# Patient Record
Sex: MALE | Race: WHITE | Employment: STUDENT | URBAN - METROPOLITAN AREA
[De-identification: names, ages, dates, MRNs, and addresses within clinical notes are randomized per-mention and may not be internally consistent; named-entity substitution may affect disease eponyms.]

---
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
## 2018-05-28 NOTE — LETTER
April 13, 2017    Patient: Molly Edwards   Date of Visit: 4/12/2017       To Whom It May Concern:    Matt Santos was seen and treated in our emergency department on 4/12/2017. He should not return to school until 4/14/17.     If you have any que
none

## 2021-07-05 ENCOUNTER — WALK IN (OUTPATIENT)
Dept: URGENT CARE | Age: 21
End: 2021-07-05

## 2021-07-05 VITALS
HEART RATE: 84 BPM | WEIGHT: 155 LBS | BODY MASS INDEX: 20.99 KG/M2 | HEIGHT: 72 IN | RESPIRATION RATE: 20 BRPM | TEMPERATURE: 99.3 F | DIASTOLIC BLOOD PRESSURE: 84 MMHG | SYSTOLIC BLOOD PRESSURE: 120 MMHG

## 2021-07-05 DIAGNOSIS — H72.91 ACUTE OTITIS MEDIA OF RIGHT EAR WITH PERFORATED TYMPANIC MEMBRANE: ICD-10-CM

## 2021-07-05 DIAGNOSIS — J30.9 ALLERGIC RHINITIS, UNSPECIFIED SEASONALITY, UNSPECIFIED TRIGGER: ICD-10-CM

## 2021-07-05 DIAGNOSIS — H66.91 ACUTE OTITIS MEDIA OF RIGHT EAR WITH PERFORATED TYMPANIC MEMBRANE: ICD-10-CM

## 2021-07-05 DIAGNOSIS — H60.333 ACUTE SWIMMER'S EAR OF BOTH SIDES: Primary | ICD-10-CM

## 2021-07-05 PROCEDURE — X0943 AMG SELF PAY VISIT: HCPCS | Performed by: NURSE PRACTITIONER

## 2021-07-05 RX ORDER — FLUTICASONE PROPIONATE 50 MCG
2 SPRAY, SUSPENSION (ML) NASAL DAILY
Qty: 16 G | Refills: 0 | Status: SHIPPED | OUTPATIENT
Start: 2021-07-05

## 2021-07-05 RX ORDER — CIPROFLOXACIN AND DEXAMETHASONE 3; 1 MG/ML; MG/ML
4 SUSPENSION/ DROPS AURICULAR (OTIC) 2 TIMES DAILY
Qty: 7.5 ML | Refills: 0 | Status: SHIPPED | OUTPATIENT
Start: 2021-07-05

## 2023-05-25 ENCOUNTER — OFFICE VISIT (OUTPATIENT)
Dept: FAMILY MEDICINE CLINIC | Facility: CLINIC | Age: 23
End: 2023-05-25
Payer: COMMERCIAL

## 2023-05-25 VITALS
OXYGEN SATURATION: 98 % | HEIGHT: 72 IN | BODY MASS INDEX: 23.84 KG/M2 | WEIGHT: 176 LBS | RESPIRATION RATE: 16 BRPM | TEMPERATURE: 98 F | HEART RATE: 78 BPM | SYSTOLIC BLOOD PRESSURE: 132 MMHG | DIASTOLIC BLOOD PRESSURE: 84 MMHG

## 2023-05-25 DIAGNOSIS — J02.9 SORE THROAT: ICD-10-CM

## 2023-05-25 DIAGNOSIS — H66.001 NON-RECURRENT ACUTE SUPPURATIVE OTITIS MEDIA OF RIGHT EAR WITHOUT SPONTANEOUS RUPTURE OF TYMPANIC MEMBRANE: ICD-10-CM

## 2023-05-25 DIAGNOSIS — J31.0 RHINOSINUSITIS: Primary | ICD-10-CM

## 2023-05-25 DIAGNOSIS — J32.9 RHINOSINUSITIS: Primary | ICD-10-CM

## 2023-05-25 LAB
CONTROL LINE PRESENT WITH A CLEAR BACKGROUND (YES/NO): YES YES/NO
KIT LOT #: NORMAL NUMERIC
STREP GRP A CUL-SCR: NEGATIVE

## 2023-05-25 PROCEDURE — 87880 STREP A ASSAY W/OPTIC: CPT

## 2023-05-25 PROCEDURE — 3075F SYST BP GE 130 - 139MM HG: CPT

## 2023-05-25 PROCEDURE — 99202 OFFICE O/P NEW SF 15 MIN: CPT

## 2023-05-25 PROCEDURE — 3079F DIAST BP 80-89 MM HG: CPT

## 2023-05-25 PROCEDURE — 3008F BODY MASS INDEX DOCD: CPT

## 2023-05-25 RX ORDER — AMLODIPINE BESYLATE 5 MG/1
1 TABLET ORAL DAILY
COMMUNITY
Start: 2022-04-17

## 2023-05-25 RX ORDER — AMOXICILLIN 875 MG/1
875 TABLET, COATED ORAL 2 TIMES DAILY
Qty: 20 TABLET | Refills: 0 | Status: SHIPPED | OUTPATIENT
Start: 2023-05-25 | End: 2023-06-04

## 2023-05-25 RX ORDER — METOPROLOL SUCCINATE 50 MG/1
1 TABLET, EXTENDED RELEASE ORAL DAILY
COMMUNITY
Start: 2022-03-22

## 2023-05-30 ENCOUNTER — OFFICE VISIT (OUTPATIENT)
Dept: FAMILY MEDICINE CLINIC | Facility: CLINIC | Age: 23
End: 2023-05-30
Payer: COMMERCIAL

## 2023-05-30 VITALS
HEIGHT: 72 IN | WEIGHT: 176 LBS | BODY MASS INDEX: 23.84 KG/M2 | SYSTOLIC BLOOD PRESSURE: 128 MMHG | RESPIRATION RATE: 16 BRPM | DIASTOLIC BLOOD PRESSURE: 78 MMHG | HEART RATE: 90 BPM | OXYGEN SATURATION: 98 % | TEMPERATURE: 99 F

## 2023-05-30 DIAGNOSIS — J06.9 URI WITH COUGH AND CONGESTION: Primary | ICD-10-CM

## 2023-05-30 PROBLEM — I10 HTN (HYPERTENSION): Status: ACTIVE | Noted: 2022-01-13

## 2023-05-30 PROBLEM — R07.9 CHEST PAIN: Status: ACTIVE | Noted: 2023-05-30

## 2023-05-30 PROCEDURE — 3078F DIAST BP <80 MM HG: CPT | Performed by: NURSE PRACTITIONER

## 2023-05-30 PROCEDURE — 3008F BODY MASS INDEX DOCD: CPT | Performed by: NURSE PRACTITIONER

## 2023-05-30 PROCEDURE — 99213 OFFICE O/P EST LOW 20 MIN: CPT | Performed by: NURSE PRACTITIONER

## 2023-05-30 PROCEDURE — 3074F SYST BP LT 130 MM HG: CPT | Performed by: NURSE PRACTITIONER

## 2023-05-30 RX ORDER — FLUTICASONE PROPIONATE 50 MCG
1 SPRAY, SUSPENSION (ML) NASAL DAILY
Qty: 18 ML | Refills: 0 | Status: SHIPPED | OUTPATIENT
Start: 2023-05-30

## 2023-05-31 NOTE — PATIENT INSTRUCTIONS
Finish Amoxicillin antibiotic as prescribed  Tylenol and Motrin as needed  START Flonase nasal spray, 1 spray in each nare, daily  Continue Allegra for pnd/throat irritation  Mucinex DM for cough/congestion - use OTC as directed  Push fluids, rest  Return to care for new/worsening symptoms lasting beyond 2 weeks

## 2023-06-16 ENCOUNTER — OFFICE VISIT (OUTPATIENT)
Dept: FAMILY MEDICINE CLINIC | Facility: CLINIC | Age: 23
End: 2023-06-16
Payer: COMMERCIAL

## 2023-06-16 VITALS
HEIGHT: 71 IN | SYSTOLIC BLOOD PRESSURE: 110 MMHG | BODY MASS INDEX: 24.5 KG/M2 | DIASTOLIC BLOOD PRESSURE: 68 MMHG | HEART RATE: 75 BPM | RESPIRATION RATE: 20 BRPM | WEIGHT: 175 LBS | TEMPERATURE: 98 F

## 2023-06-16 DIAGNOSIS — R12 HEARTBURN: ICD-10-CM

## 2023-06-16 DIAGNOSIS — I10 HYPERTENSION, UNSPECIFIED TYPE: Primary | ICD-10-CM

## 2023-06-16 DIAGNOSIS — R41.840 INATTENTION: ICD-10-CM

## 2023-06-16 PROCEDURE — 3074F SYST BP LT 130 MM HG: CPT | Performed by: STUDENT IN AN ORGANIZED HEALTH CARE EDUCATION/TRAINING PROGRAM

## 2023-06-16 PROCEDURE — 3078F DIAST BP <80 MM HG: CPT | Performed by: STUDENT IN AN ORGANIZED HEALTH CARE EDUCATION/TRAINING PROGRAM

## 2023-06-16 PROCEDURE — 99204 OFFICE O/P NEW MOD 45 MIN: CPT | Performed by: STUDENT IN AN ORGANIZED HEALTH CARE EDUCATION/TRAINING PROGRAM

## 2023-06-16 PROCEDURE — 3008F BODY MASS INDEX DOCD: CPT | Performed by: STUDENT IN AN ORGANIZED HEALTH CARE EDUCATION/TRAINING PROGRAM

## 2023-06-16 RX ORDER — LORATADINE 10 MG/1
10 TABLET ORAL DAILY
COMMUNITY

## 2023-06-16 RX ORDER — OMEPRAZOLE 40 MG/1
40 CAPSULE, DELAYED RELEASE ORAL DAILY
Qty: 90 CAPSULE | Refills: 0 | Status: SHIPPED | OUTPATIENT
Start: 2023-06-16

## 2023-06-30 ENCOUNTER — MED REC SCAN ONLY (OUTPATIENT)
Dept: FAMILY MEDICINE CLINIC | Facility: CLINIC | Age: 23
End: 2023-06-30

## 2023-07-10 ENCOUNTER — TELEPHONE (OUTPATIENT)
Dept: FAMILY MEDICINE CLINIC | Facility: CLINIC | Age: 23
End: 2023-07-10

## 2023-07-10 DIAGNOSIS — R41.840 INATTENTION: ICD-10-CM

## 2023-07-10 DIAGNOSIS — I10 HYPERTENSION, UNSPECIFIED TYPE: Primary | ICD-10-CM

## 2023-07-10 RX ORDER — AMLODIPINE BESYLATE 5 MG/1
5 TABLET ORAL DAILY
Qty: 30 TABLET | Refills: 1 | Status: SHIPPED | OUTPATIENT
Start: 2023-07-10

## 2023-07-10 NOTE — TELEPHONE ENCOUNTER
I called pt and notified him the referral was placed for Dr. Chapincito Jacobson. Contact information to pt.

## 2023-07-10 NOTE — TELEPHONE ENCOUNTER
Pt called and stated he need a new referral the doctor he was referred to, is not taking new patients. Pt also need a refill on medication.  Pt have 5 days left     amLODIPine 5 MG Oral Tab     [id number] - [place], [address], [phone], [phone]

## 2023-07-10 NOTE — TELEPHONE ENCOUNTER
Refill for Amlodipine 5 mg # 30 sent.  Pt needs to have a new referral as Dr. Felix Jung is not taking any new patients,

## 2023-09-18 DIAGNOSIS — R12 HEARTBURN: ICD-10-CM

## 2023-09-18 RX ORDER — OMEPRAZOLE 40 MG/1
40 CAPSULE, DELAYED RELEASE ORAL DAILY
Qty: 90 CAPSULE | Refills: 0 | Status: SHIPPED | OUTPATIENT
Start: 2023-09-18

## 2023-10-20 ENCOUNTER — TELEPHONE (OUTPATIENT)
Dept: FAMILY MEDICINE CLINIC | Facility: CLINIC | Age: 23
End: 2023-10-20

## 2023-10-20 RX ORDER — AMLODIPINE BESYLATE 5 MG/1
5 TABLET ORAL DAILY
Qty: 30 TABLET | Refills: 0 | Status: SHIPPED | OUTPATIENT
Start: 2023-10-20

## 2023-10-20 NOTE — TELEPHONE ENCOUNTER
LOV:06/16/23      NOV:11/02/23    Medication:      Disp Refills Start End    amLODIPine 5 MG Oral Tab 30 tablet 1 7/10/2023     Sig - Route:  Take 1 tablet (5 mg total) by mouth daily. - Oral    Sent to pharmacy as: amLODIPine Besylate 5 MG Oral Tablet (Norvas        LM for pt RX was sent

## 2023-10-20 NOTE — TELEPHONE ENCOUNTER
Pt would like refill of amLODIPine 5 MG Oral Tab sent to [facility]ar[facility] [address] [facility], [address] [facility] [address] [place] [address], [phone], [phone] [[id number]] he is out of medication and is going out of town today. Thank you.

## 2023-10-23 RX ORDER — AMLODIPINE BESYLATE 5 MG/1
5 TABLET ORAL DAILY
Qty: 30 TABLET | Refills: 0 | OUTPATIENT
Start: 2023-10-23

## 2023-10-31 ENCOUNTER — LAB ENCOUNTER (OUTPATIENT)
Dept: LAB | Age: 23
End: 2023-10-31
Attending: STUDENT IN AN ORGANIZED HEALTH CARE EDUCATION/TRAINING PROGRAM
Payer: COMMERCIAL

## 2023-10-31 DIAGNOSIS — I10 HYPERTENSION, UNSPECIFIED TYPE: ICD-10-CM

## 2023-10-31 LAB
ALBUMIN SERPL-MCNC: 4.5 G/DL (ref 3.4–5)
ALBUMIN/GLOB SERPL: 1.4 {RATIO} (ref 1–2)
ALP LIVER SERPL-CCNC: 93 U/L
ALT SERPL-CCNC: 132 U/L
ANION GAP SERPL CALC-SCNC: 10 MMOL/L (ref 0–18)
AST SERPL-CCNC: 41 U/L (ref 15–37)
BASOPHILS # BLD AUTO: 0.04 X10(3) UL (ref 0–0.2)
BASOPHILS NFR BLD AUTO: 0.7 %
BILIRUB SERPL-MCNC: 0.7 MG/DL (ref 0.1–2)
BUN BLD-MCNC: 13 MG/DL (ref 9–23)
CALCIUM BLD-MCNC: 9.3 MG/DL (ref 8.5–10.1)
CHLORIDE SERPL-SCNC: 104 MMOL/L (ref 98–112)
CHOLEST SERPL-MCNC: 193 MG/DL (ref ?–200)
CO2 SERPL-SCNC: 25 MMOL/L (ref 21–32)
CORTIS SERPL-MCNC: 17.6 UG/DL
CREAT BLD-MCNC: 1.16 MG/DL
CREAT UR-SCNC: 325 MG/DL
EGFRCR SERPLBLD CKD-EPI 2021: 90 ML/MIN/1.73M2 (ref 60–?)
EOSINOPHIL # BLD AUTO: 0.2 X10(3) UL (ref 0–0.7)
EOSINOPHIL NFR BLD AUTO: 3.7 %
ERYTHROCYTE [DISTWIDTH] IN BLOOD BY AUTOMATED COUNT: 12.6 %
FASTING PATIENT LIPID ANSWER: YES
FASTING STATUS PATIENT QL REPORTED: YES
GLOBULIN PLAS-MCNC: 3.2 G/DL (ref 2.8–4.4)
GLUCOSE BLD-MCNC: 95 MG/DL (ref 70–99)
HCT VFR BLD AUTO: 46.4 %
HDLC SERPL-MCNC: 45 MG/DL (ref 40–59)
HGB BLD-MCNC: 16 G/DL
IMM GRANULOCYTES # BLD AUTO: 0.01 X10(3) UL (ref 0–1)
IMM GRANULOCYTES NFR BLD: 0.2 %
LDLC SERPL CALC-MCNC: 118 MG/DL (ref ?–100)
LYMPHOCYTES # BLD AUTO: 1.97 X10(3) UL (ref 1–4)
LYMPHOCYTES NFR BLD AUTO: 36.1 %
MCH RBC QN AUTO: 30.2 PG (ref 26–34)
MCHC RBC AUTO-ENTMCNC: 34.5 G/DL (ref 31–37)
MCV RBC AUTO: 87.7 FL
MICROALBUMIN UR-MCNC: 2.05 MG/DL
MICROALBUMIN/CREAT 24H UR-RTO: 6.3 UG/MG (ref ?–30)
MONOCYTES # BLD AUTO: 0.55 X10(3) UL (ref 0.1–1)
MONOCYTES NFR BLD AUTO: 10.1 %
NEUTROPHILS # BLD AUTO: 2.69 X10 (3) UL (ref 1.5–7.7)
NEUTROPHILS # BLD AUTO: 2.69 X10(3) UL (ref 1.5–7.7)
NEUTROPHILS NFR BLD AUTO: 49.2 %
NONHDLC SERPL-MCNC: 148 MG/DL (ref ?–130)
OSMOLALITY SERPL CALC.SUM OF ELEC: 288 MOSM/KG (ref 275–295)
PLATELET # BLD AUTO: 223 10(3)UL (ref 150–450)
POTASSIUM SERPL-SCNC: 4 MMOL/L (ref 3.5–5.1)
PROT SERPL-MCNC: 7.7 G/DL (ref 6.4–8.2)
RBC # BLD AUTO: 5.29 X10(6)UL
SODIUM SERPL-SCNC: 139 MMOL/L (ref 136–145)
TRIGL SERPL-MCNC: 170 MG/DL (ref 30–149)
TSI SER-ACNC: 2.32 MIU/ML (ref 0.36–3.74)
VLDLC SERPL CALC-MCNC: 30 MG/DL (ref 0–30)
WBC # BLD AUTO: 5.5 X10(3) UL (ref 4–11)

## 2023-10-31 PROCEDURE — 82533 TOTAL CORTISOL: CPT

## 2023-10-31 PROCEDURE — 82043 UR ALBUMIN QUANTITATIVE: CPT

## 2023-10-31 PROCEDURE — 82024 ASSAY OF ACTH: CPT

## 2023-10-31 PROCEDURE — 84443 ASSAY THYROID STIM HORMONE: CPT

## 2023-10-31 PROCEDURE — 83835 ASSAY OF METANEPHRINES: CPT

## 2023-10-31 PROCEDURE — 82088 ASSAY OF ALDOSTERONE: CPT

## 2023-10-31 PROCEDURE — 80061 LIPID PANEL: CPT

## 2023-10-31 PROCEDURE — 84244 ASSAY OF RENIN: CPT

## 2023-10-31 PROCEDURE — 85025 COMPLETE CBC W/AUTO DIFF WBC: CPT

## 2023-10-31 PROCEDURE — 82570 ASSAY OF URINE CREATININE: CPT

## 2023-10-31 PROCEDURE — 80053 COMPREHEN METABOLIC PANEL: CPT

## 2023-11-01 LAB — ACTH: 42.4 PG/ML

## 2023-11-02 ENCOUNTER — OFFICE VISIT (OUTPATIENT)
Dept: FAMILY MEDICINE CLINIC | Facility: CLINIC | Age: 23
End: 2023-11-02
Payer: COMMERCIAL

## 2023-11-02 VITALS
SYSTOLIC BLOOD PRESSURE: 130 MMHG | RESPIRATION RATE: 16 BRPM | HEART RATE: 66 BPM | TEMPERATURE: 98 F | WEIGHT: 185 LBS | BODY MASS INDEX: 25.9 KG/M2 | HEIGHT: 71 IN | DIASTOLIC BLOOD PRESSURE: 74 MMHG

## 2023-11-02 DIAGNOSIS — Z00.00 WELLNESS EXAMINATION: Primary | ICD-10-CM

## 2023-11-02 DIAGNOSIS — H66.93 BILATERAL ACUTE OTITIS MEDIA: ICD-10-CM

## 2023-11-02 DIAGNOSIS — R12 HEARTBURN: ICD-10-CM

## 2023-11-02 DIAGNOSIS — F40.243 FEAR OF FLYING: ICD-10-CM

## 2023-11-02 DIAGNOSIS — I10 HYPERTENSION, UNSPECIFIED TYPE: ICD-10-CM

## 2023-11-02 DIAGNOSIS — R74.8 ELEVATED LIVER ENZYMES: ICD-10-CM

## 2023-11-02 PROCEDURE — 3008F BODY MASS INDEX DOCD: CPT | Performed by: STUDENT IN AN ORGANIZED HEALTH CARE EDUCATION/TRAINING PROGRAM

## 2023-11-02 PROCEDURE — 99395 PREV VISIT EST AGE 18-39: CPT | Performed by: STUDENT IN AN ORGANIZED HEALTH CARE EDUCATION/TRAINING PROGRAM

## 2023-11-02 PROCEDURE — 3078F DIAST BP <80 MM HG: CPT | Performed by: STUDENT IN AN ORGANIZED HEALTH CARE EDUCATION/TRAINING PROGRAM

## 2023-11-02 PROCEDURE — 99214 OFFICE O/P EST MOD 30 MIN: CPT | Performed by: STUDENT IN AN ORGANIZED HEALTH CARE EDUCATION/TRAINING PROGRAM

## 2023-11-02 PROCEDURE — 3075F SYST BP GE 130 - 139MM HG: CPT | Performed by: STUDENT IN AN ORGANIZED HEALTH CARE EDUCATION/TRAINING PROGRAM

## 2023-11-02 RX ORDER — AMOXICILLIN AND CLAVULANATE POTASSIUM 875; 125 MG/1; MG/1
1 TABLET, FILM COATED ORAL 2 TIMES DAILY
Qty: 20 TABLET | Refills: 0 | Status: SHIPPED | OUTPATIENT
Start: 2023-11-02 | End: 2023-11-12

## 2023-11-02 RX ORDER — ALPRAZOLAM 0.5 MG/1
TABLET ORAL
COMMUNITY
Start: 2022-11-22 | End: 2023-11-02

## 2023-11-02 RX ORDER — ALPRAZOLAM 0.5 MG/1
0.5 TABLET ORAL DAILY PRN
Qty: 30 TABLET | Refills: 0 | Status: SHIPPED | OUTPATIENT
Start: 2023-11-02

## 2023-11-02 RX ORDER — METOPROLOL SUCCINATE 50 MG/1
50 TABLET, EXTENDED RELEASE ORAL DAILY
Qty: 90 TABLET | Refills: 1 | Status: SHIPPED | OUTPATIENT
Start: 2023-11-02

## 2023-11-02 RX ORDER — AMLODIPINE BESYLATE 5 MG/1
5 TABLET ORAL DAILY
Qty: 90 TABLET | Refills: 1 | Status: SHIPPED | OUTPATIENT
Start: 2023-11-02

## 2023-11-07 LAB
ALDOST/RENIN RATIO: 13.9
ALDOSTERONE: 20.4 NG/DL
RENIN ACTIVITY: 1.47 NG/ML/HR

## 2023-11-10 LAB
METANEPHRINE: 54 PG/ML
NORMETANEPHRINE: 89.8 PG/ML

## 2023-11-24 ENCOUNTER — OFFICE VISIT (OUTPATIENT)
Dept: FAMILY MEDICINE CLINIC | Facility: CLINIC | Age: 23
End: 2023-11-24
Payer: COMMERCIAL

## 2023-11-24 VITALS
RESPIRATION RATE: 18 BRPM | OXYGEN SATURATION: 99 % | WEIGHT: 182 LBS | BODY MASS INDEX: 25.48 KG/M2 | HEART RATE: 65 BPM | SYSTOLIC BLOOD PRESSURE: 130 MMHG | TEMPERATURE: 98 F | HEIGHT: 71 IN | DIASTOLIC BLOOD PRESSURE: 90 MMHG

## 2023-11-24 DIAGNOSIS — H69.91 DYSFUNCTION OF RIGHT EUSTACHIAN TUBE: Primary | ICD-10-CM

## 2023-11-24 DIAGNOSIS — J02.9 SORE THROAT: ICD-10-CM

## 2023-11-24 LAB
CONTROL LINE PRESENT WITH A CLEAR BACKGROUND (YES/NO): YES YES/NO
KIT LOT #: NORMAL NUMERIC

## 2023-11-24 PROCEDURE — 87880 STREP A ASSAY W/OPTIC: CPT | Performed by: NURSE PRACTITIONER

## 2023-11-24 PROCEDURE — 3075F SYST BP GE 130 - 139MM HG: CPT | Performed by: NURSE PRACTITIONER

## 2023-11-24 PROCEDURE — 3080F DIAST BP >= 90 MM HG: CPT | Performed by: NURSE PRACTITIONER

## 2023-11-24 PROCEDURE — 3008F BODY MASS INDEX DOCD: CPT | Performed by: NURSE PRACTITIONER

## 2023-11-24 PROCEDURE — 99213 OFFICE O/P EST LOW 20 MIN: CPT | Performed by: NURSE PRACTITIONER

## 2023-11-24 RX ORDER — LEVOCETIRIZINE DIHYDROCHLORIDE 5 MG/1
5 TABLET, FILM COATED ORAL EVERY EVENING
Qty: 30 TABLET | Refills: 0 | Status: SHIPPED | OUTPATIENT
Start: 2023-11-24

## 2023-11-24 RX ORDER — FLUTICASONE PROPIONATE 50 MCG
2 SPRAY, SUSPENSION (ML) NASAL DAILY
Qty: 1 EACH | Refills: 0 | Status: SHIPPED | OUTPATIENT
Start: 2023-11-24

## 2024-01-08 ENCOUNTER — OFFICE VISIT (OUTPATIENT)
Dept: FAMILY MEDICINE CLINIC | Facility: CLINIC | Age: 24
End: 2024-01-08
Payer: COMMERCIAL

## 2024-01-08 VITALS
SYSTOLIC BLOOD PRESSURE: 124 MMHG | BODY MASS INDEX: 25.62 KG/M2 | HEIGHT: 71 IN | OXYGEN SATURATION: 97 % | HEART RATE: 77 BPM | TEMPERATURE: 98 F | RESPIRATION RATE: 16 BRPM | DIASTOLIC BLOOD PRESSURE: 76 MMHG | WEIGHT: 183 LBS

## 2024-01-08 DIAGNOSIS — J06.9 VIRAL UPPER RESPIRATORY TRACT INFECTION: Primary | ICD-10-CM

## 2024-01-08 LAB
OPERATOR ID: NORMAL
RAPID SARS-COV-2 BY PCR: NOT DETECTED

## 2024-01-08 NOTE — PATIENT INSTRUCTIONS
Your COVID testing was negative in the office.    Use OTC meds for comfort as needed--  Ibuprofen/Tylenol for fever/pain  Use Benadryl at bedtime to reduce drainage and promote rest.  Zyrtec/Claritin/Allegra in the AM to reduce nasal drainage without sedation.   Use saline nasal sprays to reduce congestion and thin secretions.   Use Delsym for cough.   Consider applying francisca's vapo-rub or eucayptus oil to chest and feet at bedtime to reduce chest and nasal congestion.   Warm tea with honey, cough lozenges, vaporizers/steam etc.    If no better in 2-3 days, follow-up with your PCP for further evaluation.

## 2024-01-08 NOTE — PROGRESS NOTES
CHIEF COMPLAINT:     Chief Complaint   Patient presents with    Cough     All last week wasn't feeling that good, last night congestion, right ear pain, diarrhea with frequency, wheezing cough, fatigue            HPI:   Jesús Troy is a 23 year old male presents to clinic with complaints of cough, congestion, right ear pressure, diarrhea, fatigue. Fever to 99.  Reports + chills, + fever, no headache, no upset stomach, right ear pain, no rash, + diarrhea, no loss of smell/taste.    COVID exposure: none known  Sick contacts: none  COVID testing: none    Current Outpatient Medications   Medication Sig Dispense Refill    fluticasone propionate 50 MCG/ACT Nasal Suspension 2 sprays by Each Nare route daily. 1 each 0    levocetirizine 5 MG Oral Tab Take 1 tablet (5 mg total) by mouth every evening. 30 tablet 0    metoprolol succinate ER 50 MG Oral Tablet 24 Hr Take 1 tablet (50 mg total) by mouth daily. 90 tablet 1    amLODIPine 5 MG Oral Tab Take 1 tablet (5 mg total) by mouth daily. 90 tablet 1    ALPRAZolam 0.5 MG Oral Tab Take 1 tablet (0.5 mg total) by mouth daily as needed for Anxiety (flight). (Patient not taking: Reported on 1/8/2024) 30 tablet 0      Past Medical History:   Diagnosis Date    HTN (hypertension) 1/13/2022      Social History:  Social History     Socioeconomic History    Marital status: Single   Tobacco Use    Smoking status: Never    Smokeless tobacco: Never   Substance and Sexual Activity    Alcohol use: Yes     Comment: couple drinks on the weekends    Drug use: Yes     Types: Cannabis   Other Topics Concern    Caffeine Concern No    Exercise Yes     Comment: cardio, weight training        REVIEW OF SYSTEMS:   GENERAL HEALTH: feels well otherwise, decreased appetite  SKIN: denies any unusual skin lesions or rashes  HEENT: See HPI  RESPIRATORY: denies shortness of breath or wheezing  CARDIOVASCULAR: denies chest pain or palpitations   GI: denies vomiting or diarrhea  NEURO: denies dizziness  or lightheadedness    EXAM:   /76   Pulse 77   Temp 98.2 °F (36.8 °C) (Oral)   Resp 16   Ht 5' 11\" (1.803 m)   Wt 183 lb (83 kg)   SpO2 97%   BMI 25.52 kg/m²   GENERAL: well developed, well nourished, in no apparent distress  SKIN: no rashes,no suspicious lesions  HEAD: atraumatic, normocephalic  EYES: conjunctiva clear  EARS: TM's clear, non-injected, no bulging, slight b/l retraction, no  fluid bilaterally   NOSE: nostrils patent, clear nasal mucus, nasal mucosa red and swollen  THROAT: oral mucosa pink, moist. Posterior pharynx mildly erythematous. No exudates. Tonsils 2/4.  Uvula is midline.  Breath is not malodorous.  No trismus, hoarseness, muffled voice, or stridor.    NECK: supple  LUNGS: clear to auscultation bilaterally. Breathing is non labored.  CARDIO: RRR without murmur  EXTREMITIES: no cyanosis, clubbing or edema  LYMPH: no anterior cervical lymphadenopathy, no submandibular lymphadenopathy.  No  posterior cervical or occipital lymphadenopathy.    Recent Results (from the past 24 hour(s))   COVID-19 LAB TEST NON-CDC    Collection Time: 01/08/24  3:09 PM    Specimen: Nares   Result Value Ref Range    Rapid SARS-CoV-2 by PCR Not Detected Not Detected    POCT Lot Number [id number]     POCT Expiration Date 10/6/25     POCT Procedure Control Control Valid Control Valid     ,467            ASSESSMENT AND PLAN:     Encounter Diagnosis   Name Primary?    Viral upper respiratory tract infection Yes       Orders Placed This Encounter   Procedures    COVID-19 LAB TEST NON-CDC       Meds & Refills for this Visit:  Requested Prescriptions      No prescriptions requested or ordered in this encounter       Imaging & Consults:  None     Testing as above.  Comfort measures explained.   Reviewed quarantine guidelines.    Follow up with PCP in 3-5 days if not improving, condition worsens, or fever greater than or equal to 100.4 persists for 72 hours.    Verbalized understanding.    Patient  Instructions   Your COVID testing was negative in the office.    Use OTC meds for comfort as needed--  Ibuprofen/Tylenol for fever/pain  Use Benadryl at bedtime to reduce drainage and promote rest.  Zyrtec/Claritin/Allegra in the AM to reduce nasal drainage without sedation.   Use saline nasal sprays to reduce congestion and thin secretions.   Use Delsym for cough.   Consider applying francisca's vapo-rub or eucayptus oil to chest and feet at bedtime to reduce chest and nasal congestion.   Warm tea with honey, cough lozenges, vaporizers/steam etc.    If no better in 2-3 days, follow-up with your PCP for further evaluation.

## 2024-03-07 ENCOUNTER — OFFICE VISIT (OUTPATIENT)
Dept: FAMILY MEDICINE CLINIC | Facility: CLINIC | Age: 24
End: 2024-03-07
Payer: COMMERCIAL

## 2024-03-07 VITALS
TEMPERATURE: 99 F | RESPIRATION RATE: 16 BRPM | DIASTOLIC BLOOD PRESSURE: 84 MMHG | HEART RATE: 85 BPM | BODY MASS INDEX: 25.76 KG/M2 | HEIGHT: 71 IN | SYSTOLIC BLOOD PRESSURE: 128 MMHG | WEIGHT: 184 LBS | OXYGEN SATURATION: 99 %

## 2024-03-07 DIAGNOSIS — R05.8 POST-VIRAL COUGH SYNDROME: Primary | ICD-10-CM

## 2024-03-07 PROCEDURE — 99213 OFFICE O/P EST LOW 20 MIN: CPT | Performed by: NURSE PRACTITIONER

## 2024-03-07 RX ORDER — BENZONATATE 200 MG/1
200 CAPSULE ORAL 3 TIMES DAILY PRN
Qty: 30 CAPSULE | Refills: 0 | Status: SHIPPED | OUTPATIENT
Start: 2024-03-07 | End: 2024-03-17

## 2024-03-08 NOTE — PROGRESS NOTES
CHIEF COMPLAINT:     Chief Complaint   Patient presents with    Cough     Was sick for a week in [place], feels better but for the cough, x 6 weeks        HPI:   Jesús Troy is a 23 year old male who presents for cough for over 1 month. Reports cough is dry, persistent. Denies sob, wheezing, fever, chills, headache.  Symptoms have been same since onset.  Treating symptoms with nothing.      Current Outpatient Medications   Medication Sig Dispense Refill    benzonatate 200 MG Oral Cap Take 1 capsule (200 mg total) by mouth 3 (three) times daily as needed for cough. 30 capsule 0    levocetirizine 5 MG Oral Tab Take 1 tablet (5 mg total) by mouth every evening. 30 tablet 0    metoprolol succinate ER 50 MG Oral Tablet 24 Hr Take 1 tablet (50 mg total) by mouth daily. 90 tablet 1    amLODIPine 5 MG Oral Tab Take 1 tablet (5 mg total) by mouth daily. 90 tablet 1    fluticasone propionate 50 MCG/ACT Nasal Suspension 2 sprays by Each Nare route daily. (Patient not taking: Reported on 3/7/2024) 1 each 0    ALPRAZolam 0.5 MG Oral Tab Take 1 tablet (0.5 mg total) by mouth daily as needed for Anxiety (flight). (Patient not taking: Reported on 1/8/2024) 30 tablet 0      Past Medical History:   Diagnosis Date    HTN (hypertension) 1/13/2022      Past Surgical History:   Procedure Laterality Date    INJECTION, ANESTHETIC/STEROID, TRANSFORAMINAL EPIDURAL; LUMBAR/SACRAL, ADD'L LEVEL Left 10/27/2015    Procedure: LUMBAR / TRANSFORAMINAL EPIDURAL STEROID INJECTION;  Surgeon: Severino Ruvalcaba MD;  Location: [facility]    INJECTION, ANESTHETIC/STEROID, TRANSFORAMINAL EPIDURAL; LUMBAR/SACRAL, ADD'L LEVEL Left 11/19/2015    Procedure: LUMBAR / TRANSFORAMINAL EPIDURAL STEROID INJECTION;  Surgeon: Severino Ruvalcaba MD;  Location: [facility]    INJECTION, ANESTHETIC/STEROID, TRANSFORAMINAL EPIDURAL; LUMBAR/SACRAL, SINGLE LEVEL Left 10/27/2015    Procedure: LUMBAR / TRANSFORAMINAL EPIDURAL STEROID INJECTION;  Surgeon:  Severino Ruvalcaba MD;  Location: [facility]    INJECTION, ANESTHETIC/STEROID, TRANSFORAMINAL EPIDURAL; LUMBAR/SACRAL, SINGLE LEVEL Left 11/19/2015    Procedure: LUMBAR / TRANSFORAMINAL EPIDURAL STEROID INJECTION;  Surgeon: Severino Ruvalcaba MD;  Location: [facility]         Social History     Socioeconomic History    Marital status: Single   Tobacco Use    Smoking status: Never    Smokeless tobacco: Never   Substance and Sexual Activity    Alcohol use: Yes     Comment: couple drinks on the weekends    Drug use: Yes     Types: Cannabis   Other Topics Concern    Caffeine Concern No    Exercise Yes     Comment: cardio, weight training         REVIEW OF SYSTEMS:   GENERAL: feels well otherwise, good appetite  SKIN: no rashes or abnormal skin lesions  HEENT: See HPI  LUNGS: denies shortness of breath or wheezing, See HPI  CARDIOVASCULAR: denies chest pain or palpitations   GI: denies N/V/C or abdominal pain  NEURO: Denies headaches    EXAM:   /84   Pulse 85   Temp 98.6 °F (37 °C) (Oral)   Resp 16   Ht 5' 11\" (1.803 m)   Wt 184 lb (83.5 kg)   SpO2 99%   BMI 25.66 kg/m²   GENERAL: well developed, well nourished, in no apparent distress  SKIN: no rashes, no suspicious lesions  HEENT: atraumatic, normocephalic. conjunctiva clear. TM's gray, no bulging, no retraction, + fluid, bony landmarks intact. clear nasal discharge, nasal mucosa erythematous and swollen. Oral mucosa pink, moist. Posterior pharynx is not erythematous. no exudates. no Sinus tenderness with palpation.   THROAT:NECK: Supple, non-tender  LUNGS: clear to auscultation   CARDIO: RRR without murmur  EXTREMITIES: no cyanosis, clubbing or edema  LYMP: bilateral anterior cervical lymphadenopathy.    ASSESSMENT AND PLAN:   Jesús Troy is a 23 year old male who presents with     Jesús was seen today for cough.    Diagnoses and all orders for this visit:    Post-viral cough syndrome  -     benzonatate 200 MG Oral Cap; Take 1 capsule  (200 mg total) by mouth 3 (three) times daily as needed for cough.    Add zyrtec  Omeprazole trial   Risks, benefits, and side effects of medication explained and discussed.    Discussed physical exam and hpi with pt. No bacterial focus noted on exam. Pt has reassuring physical exam consistent with post viral cough. Lungs clear bilat. No respiratory distress noted. Treatment options discussed with patient and explained in detail. We reviewed symptomatic care at home. The risks, benefits and potential side effects of possible medications were reviewed. Alternatives were discussed. Monitoring parameters and expected course outlined. Patient to call PCP or go to emergency department if symptoms fail to respond as outlined, or worsen in any way. The patient agreed with the plan.  See Patient Handout    The patient indicates understanding of these issues and agrees to the plan.  The patient is asked to follow up with PCP if sx's persist or worsen.

## 2024-03-12 ENCOUNTER — OFFICE VISIT (OUTPATIENT)
Dept: FAMILY MEDICINE CLINIC | Facility: CLINIC | Age: 24
End: 2024-03-12
Payer: COMMERCIAL

## 2024-03-12 VITALS
BODY MASS INDEX: 25.76 KG/M2 | HEART RATE: 74 BPM | HEIGHT: 71 IN | RESPIRATION RATE: 16 BRPM | TEMPERATURE: 98 F | OXYGEN SATURATION: 99 % | DIASTOLIC BLOOD PRESSURE: 62 MMHG | SYSTOLIC BLOOD PRESSURE: 112 MMHG | WEIGHT: 184 LBS

## 2024-03-12 DIAGNOSIS — M62.830 MUSCLE SPASM OF BACK: ICD-10-CM

## 2024-03-12 DIAGNOSIS — R35.0 URINARY FREQUENCY: ICD-10-CM

## 2024-03-12 DIAGNOSIS — M54.50 ACUTE BILATERAL LOW BACK PAIN WITHOUT SCIATICA: Primary | ICD-10-CM

## 2024-03-12 LAB
APPEARANCE: CLEAR
BILIRUBIN: NEGATIVE
GLUCOSE (URINE DIPSTICK): NEGATIVE MG/DL
LEUKOCYTES: NEGATIVE
MULTISTIX LOT#: NORMAL NUMERIC
NITRITE, URINE: NEGATIVE
OCCULT BLOOD: NEGATIVE
PH, URINE: 5.5 (ref 4.5–8)
PROTEIN (URINE DIPSTICK): NEGATIVE MG/DL
SPECIFIC GRAVITY: >=1.03 (ref 1–1.03)
URINE-COLOR: YELLOW
UROBILINOGEN,SEMI-QN: 0.2 MG/DL (ref 0–1.9)

## 2024-03-12 PROCEDURE — 87086 URINE CULTURE/COLONY COUNT: CPT | Performed by: NURSE PRACTITIONER

## 2024-03-12 PROCEDURE — 99213 OFFICE O/P EST LOW 20 MIN: CPT | Performed by: NURSE PRACTITIONER

## 2024-03-12 PROCEDURE — 81003 URINALYSIS AUTO W/O SCOPE: CPT | Performed by: NURSE PRACTITIONER

## 2024-03-12 RX ORDER — NAPROXEN 500 MG/1
500 TABLET ORAL 2 TIMES DAILY WITH MEALS
Qty: 20 TABLET | Refills: 0 | Status: SHIPPED | OUTPATIENT
Start: 2024-03-12 | End: 2024-03-22

## 2024-03-12 NOTE — PATIENT INSTRUCTIONS
Urine culture sent        Patient Instructions    Use NSAID as directed   Apply heat to the area 3 times per day   Follow up with primary doctor if no improvement in 10 days, sooner if symptoms worsen   Go to ER for loss of bowel or bladder control, urinary symptoms, persistent pain, or sudden weakness   Avoid jerky movements when lifting   Lift with the legs by straddling the load; bend the knees to  the load; keep straight (do not bend back).   Keep objects close to the body at navel level when lifting.   Avoid prolonged sitting.   Changes positions often when sitting.   A soft support at small of back, armrest to support some body weight, a slight recline in chair may make sitting more comfortable.   Firm mattress/bed board, lying supine (flat on back) with hips flexed on pillows is beneficial when sleeping.

## 2024-03-12 NOTE — PROGRESS NOTES
CHIEF COMPLAINT:     Chief Complaint   Patient presents with    Low Back Pain     Sx onset last week Wed, feels muscular, pain has been worsening, slight spasm feeling, urinary frequency    Denies injury           HPI:   Jesús Troy is a 23 year old male who is here for complaints of back pain.  Pain is located at low back. Pain is described as  mild soreness .  Severity is mild. 3/10.  No radiation of pain.   Associated symptoms: mild muscle soreness, spasms.  Denies weakness, numbness, tingling.   Pain was precipitated by bending.  Has had for 6  days.   No recent exercising.  Pain is worsened by bending. Gets minimal relief of back pain with  tylenol .   Prior back pain hx: no prior back problems and recurrent self limited episodes of low back pain in the past.   Denies recent heavy lifting or strenuous activity.   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  Denies davis loss of bowel or bladder control.     He has had some urinary frequency.   Denies hx of kidney stone.  Lives with girlfriend.  No STI risk.     Current Outpatient Medications   Medication Sig Dispense Refill    benzonatate 200 MG Oral Cap Take 1 capsule (200 mg total) by mouth 3 (three) times daily as needed for cough. 30 capsule 0    fluticasone propionate 50 MCG/ACT Nasal Suspension 2 sprays by Each Nare route daily. (Patient not taking: Reported on 3/7/2024) 1 each 0    levocetirizine 5 MG Oral Tab Take 1 tablet (5 mg total) by mouth every evening. 30 tablet 0    metoprolol succinate ER 50 MG Oral Tablet 24 Hr Take 1 tablet (50 mg total) by mouth daily. 90 tablet 1    amLODIPine 5 MG Oral Tab Take 1 tablet (5 mg total) by mouth daily. 90 tablet 1    ALPRAZolam 0.5 MG Oral Tab Take 1 tablet (0.5 mg total) by mouth daily as needed for Anxiety (flight). (Patient not taking: Reported on 1/8/2024) 30 tablet 0      Past Medical History:   Diagnosis Date    HTN (hypertension) 1/13/2022      Social History:  Social History     Socioeconomic History    Marital  status: Single   Tobacco Use    Smoking status: Never    Smokeless tobacco: Never   Substance and Sexual Activity    Alcohol use: Yes     Comment: couple drinks on the weekends    Drug use: Yes     Types: Cannabis   Other Topics Concern    Caffeine Concern No    Exercise Yes     Comment: cardio, weight training        REVIEW OF SYSTEMS:   GENERAL: feels well otherwise, Good appetite  SKIN: denies any unusual skin lesions  LUNGS: denies shortness of breath   CARDIOVASCULAR: denies chest pain or palpitations  GI: denies abdominal pain, N/VC/D.  Denies heartburn  : Denies flank pain.  MUSCULOSKELETAL: Per HPI.  No other joints are affected  NEURO: No numbness or tingling.  No loss of bowel or bladder control.    EXAM:   /62   Pulse 74   Temp 98.3 °F (36.8 °C)   Resp 16   Ht 5' 11\" (1.803 m)   Wt 184 lb (83.5 kg)   SpO2 99%   BMI 25.66 kg/m²    GENERAL: well developed, well nourished,in no apparent distress  SKIN: no rashes,no suspicious lesions  NECK: supple, no adenopathy, no bruits  LUNGS: clear to auscultation  CV: RRR without murmur.    GI: normoactive bs x4, no masses, no tenderness  : No CVA tenderness  EXTREMITIES: no cyanosis, clubbing or edema  BACK: minimal lumbosacral tenderness.  No spinal TTP.  ROM: Full forward flexion, full extension,  full right rotation, full left rotation.    NEURO: Sensation intact.  Good sacral sensation. Straight leg raise negative.       ASSESSMENT:   Jesús Troy is a 23 year old male who presents with complaints of back pain. Findings are consistent with   Encounter Diagnoses   Name Primary?    Acute bilateral low back pain without sciatica Yes    Muscle spasm of back     Urinary frequency        PLAN:     Will send urine culture. Denies any burning but is having frequency.  No risk for STIs   Will treat back pain with naproxen.  May decrease as feeling better.   Patient declined flexeril, muscle spasm is improving.    Risks, benefits, and side effects of  medication explained and discussed.  Comfort care as listed in patient instructions.  To f/u with PCP if no improvement in 10 days, or sooner if sx worsen; discussed S&S that require immediate medical attention.       Requested Prescriptions     Signed Prescriptions Disp Refills    naproxen 500 MG Oral Tab 20 tablet 0     Sig: Take 1 tablet (500 mg total) by mouth 2 (two) times daily with meals for 10 days.         Patient Instructions   Urine culture sent        Patient Instructions    Use NSAID as directed   Apply heat to the area 3 times per day   Follow up with primary doctor if no improvement in 10 days, sooner if symptoms worsen   Go to ER for loss of bowel or bladder control, urinary symptoms, persistent pain, or sudden weakness   Avoid jerky movements when lifting   Lift with the legs by straddling the load; bend the knees to  the load; keep straight (do not bend back).   Keep objects close to the body at navel level when lifting.   Avoid prolonged sitting.   Changes positions often when sitting.   A soft support at small of back, armrest to support some body weight, a slight recline in chair may make sitting more comfortable.   Firm mattress/bed board, lying supine (flat on back) with hips flexed on pillows is beneficial when sleeping.     The patient indicates understanding of these issues and agrees to the plan.

## 2024-04-29 DIAGNOSIS — I10 HYPERTENSION, UNSPECIFIED TYPE: ICD-10-CM

## 2024-04-29 RX ORDER — AMLODIPINE BESYLATE 5 MG/1
5 TABLET ORAL DAILY
Qty: 90 TABLET | Refills: 0 | Status: SHIPPED | OUTPATIENT
Start: 2024-04-29

## 2024-04-29 NOTE — TELEPHONE ENCOUNTER
Last office visit: 11/2/2023  Last Refill: 11/2/2023  Return To Clinic: 5/2/2024  Protocol:  passed          Medication Quantity Refills Start End   amLODIPine 5 MG Oral Tab 90 tablet 1 11/2/2023 --   Sig:   Take 1 tablet (5 mg total) by mouth daily.     Route:   Oral

## 2024-05-14 ENCOUNTER — OFFICE VISIT (OUTPATIENT)
Dept: FAMILY MEDICINE CLINIC | Facility: CLINIC | Age: 24
End: 2024-05-14

## 2024-05-14 VITALS
HEART RATE: 98 BPM | OXYGEN SATURATION: 97 % | DIASTOLIC BLOOD PRESSURE: 84 MMHG | HEIGHT: 71 IN | WEIGHT: 184 LBS | TEMPERATURE: 98 F | SYSTOLIC BLOOD PRESSURE: 126 MMHG | RESPIRATION RATE: 16 BRPM | BODY MASS INDEX: 25.76 KG/M2

## 2024-05-14 DIAGNOSIS — M79.89 SWELLING IN LEFT ARMPIT: Primary | ICD-10-CM

## 2024-05-14 PROCEDURE — 99212 OFFICE O/P EST SF 10 MIN: CPT

## 2024-05-14 NOTE — PROGRESS NOTES
Subjective:   Patient ID: Jesús Troy is a 23 year old male.    Patient presents with possible swelling of lymph node in left axillary area. Denies any fever, weight loss, or night sweats. Denies any injury or trauma to area. Reports that he will occasionally feel like there is swelling and then other times feel like it is not swollen.    Swelling  This is a new problem. The current episode started 1 to 4 weeks ago. The problem occurs intermittently. Nothing aggravates the symptoms. He has tried nothing for the symptoms.       History/Other:   Review of Systems   All other systems reviewed and are negative.    Current Outpatient Medications   Medication Sig Dispense Refill    amLODIPine 5 MG Oral Tab Take 1 tablet (5 mg total) by mouth daily. 90 tablet 0    levocetirizine 5 MG Oral Tab Take 1 tablet (5 mg total) by mouth every evening. 30 tablet 0    metoprolol succinate ER 50 MG Oral Tablet 24 Hr Take 1 tablet (50 mg total) by mouth daily. 90 tablet 1    ALPRAZolam 0.5 MG Oral Tab Take 1 tablet (0.5 mg total) by mouth daily as needed for Anxiety (flight). 30 tablet 0    fluticasone propionate 50 MCG/ACT Nasal Suspension 2 sprays by Each Nare route daily. (Patient not taking: Reported on 3/7/2024) 1 each 0     Allergies:  Allergies   Allergen Reactions    Seasonal        Objective:   Physical Exam  Vitals reviewed.   Constitutional:       Appearance: Normal appearance.   HENT:      Head: Normocephalic and atraumatic.      Nose: Nose normal.      Mouth/Throat:      Mouth: Mucous membranes are moist.      Pharynx: Oropharynx is clear.   Cardiovascular:      Rate and Rhythm: Normal rate and regular rhythm.      Pulses: Normal pulses.      Heart sounds: Normal heart sounds.   Pulmonary:      Effort: Pulmonary effort is normal.      Breath sounds: Normal breath sounds.   Musculoskeletal:         General: Swelling present.      Cervical back: Normal range of motion and neck supple.   Lymphadenopathy:      Comments:  No axillary lymphadenopathy noted on exam. Left upper extremity was moved in multiple positions in attempt to palpate.   Skin:     General: Skin is warm and dry.      Capillary Refill: Capillary refill takes less than 2 seconds.   Neurological:      General: No focal deficit present.      Mental Status: He is alert and oriented to person, place, and time.   Psychiatric:         Mood and Affect: Mood normal.         Behavior: Behavior normal.         Thought Content: Thought content normal.         Judgment: Judgment normal.         Assessment & Plan:   1. Swelling in left armpit        Instructed to continue to monitor for symptoms and follow up with PCP for further work up.    Meds This Visit:  Requested Prescriptions      No prescriptions requested or ordered in this encounter       Imaging & Referrals:  None

## 2024-06-10 DIAGNOSIS — I10 HYPERTENSION, UNSPECIFIED TYPE: ICD-10-CM

## 2024-06-10 RX ORDER — AMLODIPINE BESYLATE 5 MG/1
5 TABLET ORAL DAILY
Qty: 90 TABLET | Refills: 0 | Status: SHIPPED | OUTPATIENT
Start: 2024-06-10

## 2024-06-10 RX ORDER — METOPROLOL SUCCINATE 50 MG/1
50 TABLET, EXTENDED RELEASE ORAL DAILY
Qty: 90 TABLET | Refills: 0 | Status: SHIPPED | OUTPATIENT
Start: 2024-06-10

## 2024-06-10 NOTE — TELEPHONE ENCOUNTER
Rx refill     amLODIPine 5 MG Oral Tab     metoprolol succinate ER 50 MG Oral Tablet 24 Hr     Send to   [facility] [address] [phone], [phone]

## 2024-07-01 ENCOUNTER — LAB ENCOUNTER (OUTPATIENT)
Dept: LAB | Age: 24
End: 2024-07-01
Attending: STUDENT IN AN ORGANIZED HEALTH CARE EDUCATION/TRAINING PROGRAM
Payer: COMMERCIAL

## 2024-07-01 ENCOUNTER — OFFICE VISIT (OUTPATIENT)
Dept: FAMILY MEDICINE CLINIC | Facility: CLINIC | Age: 24
End: 2024-07-01
Payer: COMMERCIAL

## 2024-07-01 VITALS
WEIGHT: 186.19 LBS | HEIGHT: 71 IN | HEART RATE: 78 BPM | SYSTOLIC BLOOD PRESSURE: 122 MMHG | DIASTOLIC BLOOD PRESSURE: 68 MMHG | RESPIRATION RATE: 16 BRPM | TEMPERATURE: 97 F | BODY MASS INDEX: 26.06 KG/M2

## 2024-07-01 DIAGNOSIS — I10 HYPERTENSION, UNSPECIFIED TYPE: Primary | ICD-10-CM

## 2024-07-01 DIAGNOSIS — R74.8 ELEVATED LIVER ENZYMES: ICD-10-CM

## 2024-07-01 LAB
ALBUMIN SERPL-MCNC: 4.4 G/DL (ref 3.4–5)
ALBUMIN/GLOB SERPL: 1.4 {RATIO} (ref 1–2)
ALP LIVER SERPL-CCNC: 100 U/L
ALT SERPL-CCNC: 91 U/L
ANION GAP SERPL CALC-SCNC: 9 MMOL/L (ref 0–18)
AST SERPL-CCNC: 38 U/L (ref 15–37)
BILIRUB SERPL-MCNC: 0.5 MG/DL (ref 0.1–2)
BUN BLD-MCNC: 12 MG/DL (ref 9–23)
CALCIUM BLD-MCNC: 9 MG/DL (ref 8.5–10.1)
CHLORIDE SERPL-SCNC: 106 MMOL/L (ref 98–112)
CO2 SERPL-SCNC: 27 MMOL/L (ref 21–32)
CREAT BLD-MCNC: 1.05 MG/DL
EGFRCR SERPLBLD CKD-EPI 2021: 101 ML/MIN/1.73M2 (ref 60–?)
FASTING STATUS PATIENT QL REPORTED: NO
GLOBULIN PLAS-MCNC: 3.1 G/DL (ref 2.8–4.4)
GLUCOSE BLD-MCNC: 91 MG/DL (ref 70–99)
OSMOLALITY SERPL CALC.SUM OF ELEC: 293 MOSM/KG (ref 275–295)
POTASSIUM SERPL-SCNC: 3.8 MMOL/L (ref 3.5–5.1)
PROT SERPL-MCNC: 7.5 G/DL (ref 6.4–8.2)
SODIUM SERPL-SCNC: 142 MMOL/L (ref 136–145)

## 2024-07-01 PROCEDURE — 99213 OFFICE O/P EST LOW 20 MIN: CPT | Performed by: STUDENT IN AN ORGANIZED HEALTH CARE EDUCATION/TRAINING PROGRAM

## 2024-07-01 PROCEDURE — 80053 COMPREHEN METABOLIC PANEL: CPT

## 2024-07-01 PROCEDURE — G2211 COMPLEX E/M VISIT ADD ON: HCPCS | Performed by: STUDENT IN AN ORGANIZED HEALTH CARE EDUCATION/TRAINING PROGRAM

## 2024-07-01 RX ORDER — METOPROLOL SUCCINATE 50 MG/1
50 TABLET, EXTENDED RELEASE ORAL DAILY
Qty: 90 TABLET | Refills: 1 | Status: SHIPPED | OUTPATIENT
Start: 2024-07-01

## 2024-07-01 RX ORDER — AMLODIPINE BESYLATE 5 MG/1
5 TABLET ORAL DAILY
Qty: 90 TABLET | Refills: 1 | Status: SHIPPED | OUTPATIENT
Start: 2024-07-01

## 2024-07-01 NOTE — PROGRESS NOTES
[facility] Note  07/01/24    Chief Complaint   Patient presents with    Medication Follow-Up     HPI:   Jesús Troy is a 23 year old male who presents for medication follow up.    Patient presents for recheck of his hypertension. Pt has been taking medications as instructed, no medication side effects, home BP monitoring in the range of 110-120's systolic and 70's diastolic.  BP Meds: amLODIPine Tabs - 5 MG; metoprolol succinate ER Tb24 - 50 MG     Denies chest pain, shortness of breath, nausea, headaches, vision changes, paresthesias.    Using xanax for flying, doing well when not flying.     Was using flonase for allergies as needed. Taking xyzal.     Had cold a few weeks ago, had sore throat and was stuffy. Was negative for covid.     Wt Readings from Last 6 Encounters:   07/01/24 186 lb 3.2 oz (84.5 kg)   05/14/24 184 lb (83.5 kg)   03/12/24 184 lb (83.5 kg)   03/07/24 184 lb (83.5 kg)   01/08/24 183 lb (83 kg)   11/24/23 182 lb (82.6 kg)     Past Medical History:    HTN (hypertension)     Past Surgical History:   Procedure Laterality Date    Injection, anesthetic/steroid, transforaminal epidural; lumbar/sacral, add'l level Left 10/27/2015    Procedure: LUMBAR / TRANSFORAMINAL EPIDURAL STEROID INJECTION;  Surgeon: Severino Ruvalcaba MD;  Location: [facility]    Injection, anesthetic/steroid, transforaminal epidural; lumbar/sacral, add'l level Left 11/19/2015    Procedure: LUMBAR / TRANSFORAMINAL EPIDURAL STEROID INJECTION;  Surgeon: Severino Ruvalcaba MD;  Location: [facility]    Injection, anesthetic/steroid, transforaminal epidural; lumbar/sacral, single level Left 10/27/2015    Procedure: LUMBAR / TRANSFORAMINAL EPIDURAL STEROID INJECTION;  Surgeon: Severino Ruvalcaba MD;  Location: [facility]    Injection, anesthetic/steroid, transforaminal epidural; lumbar/sacral, single level Left 11/19/2015    Procedure: LUMBAR / TRANSFORAMINAL EPIDURAL STEROID  INJECTION;  Surgeon: Severino Ruvalcaba MD;  [facility]     Allergies   Allergen Reactions    Seasonal       amLODIPine 5 MG Oral Tab Take 1 tablet (5 mg total) by mouth daily. 90 tablet 1    metoprolol succinate ER 50 MG Oral Tablet 24 Hr Take 1 tablet (50 mg total) by mouth daily. 90 tablet 1    fluticasone propionate 50 MCG/ACT Nasal Suspension 2 sprays by Each Nare route daily. 1 each 0    levocetirizine 5 MG Oral Tab Take 1 tablet (5 mg total) by mouth every evening. 30 tablet 0    ALPRAZolam 0.5 MG Oral Tab Take 1 tablet (0.5 mg total) by mouth daily as needed for Anxiety (flight). 30 tablet 0     Social History     Socioeconomic History    Marital status: Single   Tobacco Use    Smoking status: Never     Passive exposure: Past    Smokeless tobacco: Never   Substance and Sexual Activity    Alcohol use: Yes     Comment: couple drinks on the weekends    Drug use: Yes     Types: Cannabis   Other Topics Concern    Caffeine Concern No    Exercise Yes     Comment: cardio, weight training     Counseling given: Not Answered    Family History   Problem Relation Age of Onset    Hypertension Father      Family Status   Relation Status    Fa (Not Specified)        REVIEW OF SYSTEMS:   See HPI    EXAM:   /68 (BP Location: Left arm, Patient Position: Sitting, Cuff Size: adult)   Pulse 78   Temp 97 °F (36.1 °C) (Temporal)   Resp 16   Ht 5' 11\" (1.803 m)   Wt 186 lb 3.2 oz (84.5 kg)   BMI 25.97 kg/m²  Estimated body mass index is 25.97 kg/m² as calculated from the following:    Height as of this encounter: 5' 11\" (1.803 m).    Weight as of this encounter: 186 lb 3.2 oz (84.5 kg).   Vital signs reviewed. Appears stated age, well groomed.  Physical Exam:  GEN:  Patient is alert and oriented x3, no apparent distress  HEAD:  Normocephalic, atraumatic  HEENT:  no scleral icterus, conjunctivae clear bilaterally, EOMI, PERRLA, OP clear  LUNGS: clear to auscultation bilaterally, no  rales/rhonchi/wheezing  HEART:  Regular rate and rhythm, normal S1/S2, no murmurs, rubs or gallops  ABDOMEN:  Bowel sounds normal, soft, nondistended, nontender, no hepatosplenomegaly  EXTREMITIES:  Moves all extremities well  NEURO:  CN 2 - 12 grossly intact, gait normal, 2+ patellar reflexes b/l    ASSESSMENT AND PLAN:   1. Hypertension, unspecified type  Pt presents for follow up of HTN  - no red flag symptoms  - BP controlled  - continue current regimen as ordered  - RTC 6mo or sooner if needed  - amLODIPine 5 MG Oral Tab; Take 1 tablet (5 mg total) by mouth daily.  Dispense: 90 tablet; Refill: 1  - metoprolol succinate ER 50 MG Oral Tablet 24 Hr; Take 1 tablet (50 mg total) by mouth daily.  Dispense: 90 tablet; Refill: 1    2. Elevated liver enzymes  Advised to recheck CMP      Meds & Refills for this Visit:  Requested Prescriptions     Signed Prescriptions Disp Refills    amLODIPine 5 MG Oral Tab 90 tablet 1     Sig: Take 1 tablet (5 mg total) by mouth daily.    metoprolol succinate ER 50 MG Oral Tablet 24 Hr 90 tablet 1     Sig: Take 1 tablet (50 mg total) by mouth daily.           Health Maintenance:  Health Maintenance Due   Topic Date Due    COVID-19 Vaccine (1 - 2023-24 season) Never done    Annual Depression Screening  01/01/2024       Patient/Caregiver Education: There are no barriers to learning. Medical education done.   Outcome: Patient verbalizes understanding. Patient is notified to call with any questions, complications, allergies, or worsening or changing symptoms.  Patient is to call with any side effects or complications from the treatments as a result of today.     Problem List:  Patient Active Problem List   Diagnosis    Avulsion fracture    Right ischial fracture (HCC)    Male pelvic pain    Chest pain    HTN (hypertension)       Return in about 6 months (around 1/1/2025) for 1. annual physical, 2. medication follow up, or sooner if needed.    Veronica La MD  [facility]  07/01/24      Please note that portions of this note may have been completed with a voice recognition program. Efforts were made to edit the dictations but occasionally words are mis-transcribed. Thank you for your understanding.

## 2024-08-25 ENCOUNTER — OFFICE VISIT (OUTPATIENT)
Dept: FAMILY MEDICINE CLINIC | Facility: CLINIC | Age: 24
End: 2024-08-25
Payer: COMMERCIAL

## 2024-08-25 VITALS
DIASTOLIC BLOOD PRESSURE: 82 MMHG | HEIGHT: 72 IN | WEIGHT: 175 LBS | BODY MASS INDEX: 23.7 KG/M2 | TEMPERATURE: 99 F | HEART RATE: 77 BPM | RESPIRATION RATE: 16 BRPM | SYSTOLIC BLOOD PRESSURE: 130 MMHG | OXYGEN SATURATION: 99 %

## 2024-08-25 DIAGNOSIS — B35.3 TINEA PEDIS OF RIGHT FOOT: ICD-10-CM

## 2024-08-25 DIAGNOSIS — R35.0 URINARY FREQUENCY: ICD-10-CM

## 2024-08-25 DIAGNOSIS — N48.1 BALANITIS: Primary | ICD-10-CM

## 2024-08-25 LAB
APPEARANCE: CLEAR
BILIRUBIN: NEGATIVE
GLUCOSE (URINE DIPSTICK): NEGATIVE MG/DL
KETONES (URINE DIPSTICK): NEGATIVE MG/DL
LEUKOCYTES: NEGATIVE
MULTISTIX LOT#: NORMAL NUMERIC
NITRITE, URINE: NEGATIVE
OCCULT BLOOD: NEGATIVE
PH, URINE: 7 (ref 4.5–8)
PROTEIN (URINE DIPSTICK): NEGATIVE MG/DL
SPECIFIC GRAVITY: 1.02 (ref 1–1.03)
URINE-COLOR: YELLOW
UROBILINOGEN,SEMI-QN: 0.2 MG/DL (ref 0–1.9)

## 2024-08-25 RX ORDER — MUPIROCIN 20 MG/G
1 OINTMENT TOPICAL 3 TIMES DAILY
Qty: 1 EACH | Refills: 0 | Status: SHIPPED | OUTPATIENT
Start: 2024-08-25 | End: 2024-09-01

## 2024-08-25 RX ORDER — CLOTRIMAZOLE 1 %
1 CREAM (GRAM) TOPICAL 2 TIMES DAILY
Qty: 60 G | Refills: 0 | Status: SHIPPED | OUTPATIENT
Start: 2024-08-25 | End: 2024-09-08

## 2024-08-25 NOTE — PATIENT INSTRUCTIONS
Clean skin lightly   Keep feet clean and dry   Apply both clotrimazole and mupirocin to groin area   Clotrimazole for the feet   Monitor groin area for increased redness, swelling, discharge, pain, fever- recheck if occurs   Close follow up with PCP for further athlete's foot management if persists

## 2024-08-25 NOTE — PROGRESS NOTES
CHIEF COMPLAINT:     Chief Complaint   Patient presents with    Rash     X 1 day  Sx: rash in groin area after swimming          HPI:    Jesús Troy is a 23 year old male who presents for evaluation of a rash on the tip of the penis that started after swimming yesterday. + redness and burning sensation in the area. No discharge. + increased frequency of urination and increased burning when urinating. No hx of similar rash. Denies abdominal pain, back pain, nausea/vomiting, fever, hematuria. No new sexual partners- same partner for 4 years and not recently sexually active. Has not used any OTC medication    Also complains of itchy right foot and flaky skin for \" awhile\" Has hx of athletes foot and feels like its returning . Has been on oral antifungal and helped but he did not tolerate well       Current Outpatient Medications   Medication Sig Dispense Refill    mupirocin 2 % External Ointment Apply 1 Application topically 3 (three) times daily for 7 days. 1 each 0    clotrimazole 1 % External Cream Apply 1 Application topically 2 (two) times daily for 14 days. 60 g 0    amLODIPine 5 MG Oral Tab Take 1 tablet (5 mg total) by mouth daily. 90 tablet 1    metoprolol succinate ER 50 MG Oral Tablet 24 Hr Take 1 tablet (50 mg total) by mouth daily. 90 tablet 1    fluticasone propionate 50 MCG/ACT Nasal Suspension 2 sprays by Each Nare route daily. 1 each 0    levocetirizine 5 MG Oral Tab Take 1 tablet (5 mg total) by mouth every evening. 30 tablet 0    ALPRAZolam 0.5 MG Oral Tab Take 1 tablet (0.5 mg total) by mouth daily as needed for Anxiety (flight). 30 tablet 0      Past Medical History:    HTN (hypertension)      Past Surgical History:   Procedure Laterality Date    Injection, anesthetic/steroid, transforaminal epidural; lumbar/sacral, add'l level Left 10/27/2015    Procedure: LUMBAR / TRANSFORAMINAL EPIDURAL STEROID INJECTION;  Surgeon: Severino Ruvalcaba MD;  Location: [facility]    Injection,  anesthetic/steroid, transforaminal epidural; lumbar/sacral, add'l level Left 11/19/2015    Procedure: LUMBAR / TRANSFORAMINAL EPIDURAL STEROID INJECTION;  Surgeon: Severino Ruvalcaba MD;  Location: [facility]    Injection, anesthetic/steroid, transforaminal epidural; lumbar/sacral, single level Left 10/27/2015    Procedure: LUMBAR / TRANSFORAMINAL EPIDURAL STEROID INJECTION;  Surgeon: Severino Ruvalcaba MD;  Location: [facility]    Injection, anesthetic/steroid, transforaminal epidural; lumbar/sacral, single level Left 11/19/2015    Procedure: LUMBAR / TRANSFORAMINAL EPIDURAL STEROID INJECTION;  Surgeon: Severino Ruvalcaba MD;  Location: [facility]      Family History   Problem Relation Age of Onset    Hypertension Father       Social History     Socioeconomic History    Marital status: Single   Tobacco Use    Smoking status: Never     Passive exposure: Past    Smokeless tobacco: Never   Substance and Sexual Activity    Alcohol use: Yes     Comment: couple drinks on the weekends    Drug use: Yes     Types: Cannabis   Other Topics Concern    Caffeine Concern No    Exercise Yes     Comment: cardio, weight training         REVIEW OF SYSTEMS:   GENERAL: feels well otherwise  SKIN: Per HPI.   HEENT: Denies rhinorrhea, edema of the lips or swelling of throat.  CARDIOVASCULAR: Denies chest pains or palpitations.  LUNGS: Denies shortness of breath with exertion or rest. No cough or wheezing.  LYMPH: Denies enlargement of the lymph nodes.        EXAM:   /82   Pulse 77   Temp 98.6 °F (37 °C)   Resp 16   Ht 6' (1.829 m)   Wt 175 lb (79.4 kg)   SpO2 99%   BMI 23.73 kg/m²   Physical Exam  Constitutional:       Appearance: Normal appearance. He is not ill-appearing.   HENT:      Head: Normocephalic and atraumatic.   Cardiovascular:      Rate and Rhythm: Normal rate and regular rhythm.      Heart sounds: No murmur heard.  Pulmonary:      Effort: Pulmonary effort is normal.      Breath sounds: Normal  breath sounds. No wheezing or rhonchi.   Abdominal:      General: Abdomen is flat. Bowel sounds are normal. There is no distension.      Palpations: Abdomen is soft.      Tenderness: There is no abdominal tenderness. There is no right CVA tenderness, left CVA tenderness or guarding.   Genitourinary:     Pubic Area: No rash.       Penis: Circumcised. Erythema (Diffuse throughout the head of the penis) and lesions (Scattered tiny papular lesions at head of the penis) present. No tenderness, discharge or swelling.       Testes: Normal.      Comments: + scabbing and crusting noted near urethra meatus, no obstruction   Skin:     General: Skin is warm.      Findings: Rash present.      Comments: Dry cracked skin with scattered erythema present heel, medial, and lateral right foot    Neurological:      Mental Status: He is alert.           ASSESSMENT AND PLAN:   Jesús Troy is a 23 year old male who presents for evaluation of a rash. Findings are consistent with:    ASSESSMENT:  Encounter Diagnoses   Name Primary?    Balanitis Yes    Tinea pedis of right foot     Urinary frequency      UA unremarkable in office   Discussed balanitis likely combination of bacterial and fungal infection based on exam findings. Start both clotrimazole and mupirocin   Clotrimazole for athletes foot       PLAN: Meds as listed below.  Comfort measures as described in Patient Instructions.  Skin care discussed with patient.     Meds & Refills for this Visit:  Requested Prescriptions     Signed Prescriptions Disp Refills    mupirocin 2 % External Ointment 1 each 0     Sig: Apply 1 Application topically 3 (three) times daily for 7 days.    clotrimazole 1 % External Cream 60 g 0     Sig: Apply 1 Application topically 2 (two) times daily for 14 days.       Risk and benefits of medication discussed.     The patient indicates understanding of these issues and agrees to the plan.  The patient is asked to see PCP in 3 days if sx's are not improving  or if they worsen.    Patient Instructions   Clean skin lightly   Keep feet clean and dry   Apply both clotrimazole and mupirocin to groin area   Clotrimazole for the feet   Monitor groin area for increased redness, swelling, discharge, pain, fever- recheck if occurs   Close follow up with PCP for further athlete's foot management if persists

## 2024-10-14 DIAGNOSIS — I10 HYPERTENSION, UNSPECIFIED TYPE: ICD-10-CM

## 2024-10-16 RX ORDER — AMLODIPINE BESYLATE 5 MG/1
5 TABLET ORAL DAILY
Qty: 90 TABLET | Refills: 0 | Status: SHIPPED | OUTPATIENT
Start: 2024-10-16

## 2024-11-20 ENCOUNTER — OFFICE VISIT (OUTPATIENT)
Dept: FAMILY MEDICINE CLINIC | Facility: CLINIC | Age: 24
End: 2024-11-20
Payer: COMMERCIAL

## 2024-11-20 VITALS
BODY MASS INDEX: 25.87 KG/M2 | HEIGHT: 71 IN | RESPIRATION RATE: 16 BRPM | SYSTOLIC BLOOD PRESSURE: 122 MMHG | WEIGHT: 184.81 LBS | TEMPERATURE: 97 F | HEART RATE: 82 BPM | DIASTOLIC BLOOD PRESSURE: 80 MMHG

## 2024-11-20 DIAGNOSIS — F40.243 FEAR OF FLYING: ICD-10-CM

## 2024-11-20 DIAGNOSIS — M54.2 CHRONIC NECK PAIN: ICD-10-CM

## 2024-11-20 DIAGNOSIS — Z00.00 WELLNESS EXAMINATION: Primary | ICD-10-CM

## 2024-11-20 DIAGNOSIS — G89.29 CHRONIC NECK PAIN: ICD-10-CM

## 2024-11-20 DIAGNOSIS — I10 HYPERTENSION, UNSPECIFIED TYPE: ICD-10-CM

## 2024-11-20 PROCEDURE — 99214 OFFICE O/P EST MOD 30 MIN: CPT | Performed by: STUDENT IN AN ORGANIZED HEALTH CARE EDUCATION/TRAINING PROGRAM

## 2024-11-20 PROCEDURE — 99395 PREV VISIT EST AGE 18-39: CPT | Performed by: STUDENT IN AN ORGANIZED HEALTH CARE EDUCATION/TRAINING PROGRAM

## 2024-11-20 RX ORDER — ALPRAZOLAM 0.5 MG
0.5 TABLET ORAL DAILY PRN
Qty: 30 TABLET | Refills: 0 | Status: SHIPPED | OUTPATIENT
Start: 2024-11-20 | End: 2024-11-22

## 2024-11-20 RX ORDER — METOPROLOL SUCCINATE 50 MG/1
50 TABLET, EXTENDED RELEASE ORAL DAILY
Qty: 90 TABLET | Refills: 1 | Status: CANCELLED | OUTPATIENT
Start: 2024-11-20

## 2024-11-20 NOTE — PROGRESS NOTES
[facility]  11/20/24    Chief Complaint   Patient presents with    Physical    Medication Follow-Up     HPI:   Jesús Troy is a 24 year old male who presents for a complete physical exam.     Hurt his neck without known injury, has been flaring up intermittently, has been 6 months. Worse on the right side. Worse when laying down.     Med/Surg/Allergy/Fam hx updates: broke foot in August 2024, out of the boot now - slipped in new pair of socks down the stairs  Home: going well, closed on house in May 2024, house is 110 years old and has to maintain some historical aspects   Work: going well  Activities/Hobbies: yes  Diet: healthy overall, working on caffeine  Exercise: housework, slowly increasing due to broken foot  Sleep: good  Mood: good  Habits: social alcohol, no tobacco or drug use  Immunizations: got flu shot    Patient presents for recheck of his hypertension. Pt has been taking medications as instructed, no medication side effects, home BP monitoring and it is good at home.   BP Meds: amLODIPine Tabs - 5 MG; metoprolol succinate ER Tb24 - 50 MG     Last Cr was 1.05 done on 7/1/2024.Last eGFR was 101 on 7/1/2024.    Patient would like refill of alprazolam, uses for travel. Has anxiety with plane travel.     Will be proposing next week to his girlfriend.     Using xyzal as needed and flonase as needed.     Wt Readings from Last 6 Encounters:   11/20/24 184 lb 12.8 oz (83.8 kg)   08/25/24 175 lb (79.4 kg)   07/01/24 186 lb 3.2 oz (84.5 kg)   05/14/24 184 lb (83.5 kg)   03/12/24 184 lb (83.5 kg)   03/07/24 184 lb (83.5 kg)     Body mass index is 25.77 kg/m².     Results for orders placed or performed in visit on 08/25/24   Urine Dip, auto without Micro    Collection Time: 08/25/24  2:43 PM   Result Value Ref Range    Glucose Urine Negative Negative mg/dL    Bilirubin Urine Negative Negative    Ketones, UA Negative Negative - Trace mg/dL    Spec Gravity 1.025 1.005 - 1.030    Blood  Urine Negative Negative    PH Urine 7.0 5.0 - 8.0    Protein Urine Negative Negative - Trace mg/dL    Urobilinogen Urine 0.2 0.2 - 1.0 mg/dL    Nitrite Urine Negative Negative    Leukocyte Esterase Urine Negative Negative    APPEARANCE Clear Clear    Color Urine Yellow Yellow    Multistix Lot# [id number] Numeric    Multistix Expiration Date 05- Date       Current Outpatient Medications   Medication Sig Dispense Refill    ALPRAZolam 0.5 MG Oral Tab Take 1 tablet (0.5 mg total) by mouth daily as needed for Anxiety (flight). 30 tablet 0    amLODIPine 5 MG Oral Tab Take 1 tablet (5 mg total) by mouth daily. 90 tablet 0    metoprolol succinate ER 50 MG Oral Tablet 24 Hr Take 1 tablet (50 mg total) by mouth daily. 90 tablet 1    fluticasone propionate 50 MCG/ACT Nasal Suspension 2 sprays by Each Nare route daily. 1 each 0    levocetirizine 5 MG Oral Tab Take 1 tablet (5 mg total) by mouth every evening. 30 tablet 0      Allergies[1]   Past Medical History:    HTN (hypertension)      Past Surgical History:   Procedure Laterality Date    Injection, anesthetic/steroid, transforaminal epidural; lumbar/sacral, add'l level Left 10/27/2015    Procedure: LUMBAR / TRANSFORAMINAL EPIDURAL STEROID INJECTION;  Surgeon: Severino Ruvalcaba MD;  Location: [facility]    Injection, anesthetic/steroid, transforaminal epidural; lumbar/sacral, add'l level Left 11/19/2015    Procedure: LUMBAR / TRANSFORAMINAL EPIDURAL STEROID INJECTION;  Surgeon: Severino Ruvalcaba MD;  Location: [facility]    Injection, anesthetic/steroid, transforaminal epidural; lumbar/sacral, single level Left 10/27/2015    Procedure: LUMBAR / TRANSFORAMINAL EPIDURAL STEROID INJECTION;  Surgeon: Severino Ruvalcaba MD;  Location: [facility]    Injection, anesthetic/steroid, transforaminal epidural; lumbar/sacral, single level Left 11/19/2015    Procedure: LUMBAR / TRANSFORAMINAL EPIDURAL STEROID INJECTION;  Surgeon: Severino Ruvalcaba MD;  Location:  [facility]      Family History   Problem Relation Age of Onset    Hypertension Father       Social History:  Social History     Socioeconomic History    Marital status: Single   Tobacco Use    Smoking status: Never     Passive exposure: Past    Smokeless tobacco: Never   Substance and Sexual Activity    Alcohol use: Yes     Comment: couple drinks on the weekends    Drug use: Yes   Other Topics Concern    Caffeine Concern No    Exercise Yes     Comment: cardio, weight training    Seat Belt Yes         REVIEW OF SYSTEMS:   GENERAL: feels well otherwise  SKIN: denies any unusual skin lesions  EYES:denies blurred vision or double vision  HEENT: denies nasal congestion, sinus pain or ST  LUNGS: denies shortness of breath with exertion, denies cough  CARDIOVASCULAR: denies chest pain on exertion or at rest, denies palpitations  GI: denies abdominal pain,denies heartburn, denies n/v/d/c/blood in stool  : denies nocturia or changes in stream  MUSCULOSKELETAL: denies back pain  NEURO: denies headaches, denies LH/dizziness/syncope  PSYCHE: denies depression or anxiety  HEMATOLOGIC: denies hx of anemia  ENDOCRINE: denies thyroid history  ALL/ASTHMA: denies hx of allergy or asthma    EXAM:   /80   Pulse 82   Temp 97.2 °F (36.2 °C) (Temporal)   Resp 16   Ht 5' 11\" (1.803 m)   Wt 184 lb 12.8 oz (83.8 kg)   BMI 25.77 kg/m²   Body mass index is 25.77 kg/m².   GENERAL: well developed, well nourished,in no apparent distress  SKIN: no rashes,no suspicious lesions  HEENT: atraumatic, normocephalic,ears and throat are clear  EYES:PERRLA, EOMI, conjunctiva are clear  NECK: supple,no adenopathy,no bruits, no thyromegaly  LUNGS: clear to auscultation, no r/r/w  CARDIO: RRR without murmur  GI: good BS's,no masses, HSM or tenderness  :  two descended testes,no masses,no hernia,no penile lesions  RECTAL: good rectal tone, prostate shows no masses, stool is OB negative  MUSCULOSKELETAL: back is not tender,FROM of the  back  EXTREMITIES: no cyanosis, clubbing or edema  NEURO: Oriented times three, cranial nerves are intact, motor and sensory are grossly intact; 2+ knee reflexes bilaterally  VASCULAR: 2+ posterior tibialis pulses bilaterally    ASSESSMENT AND PLAN:   Jesús Troy is a 24 year old male who presents for a complete physical exam.        1. Wellness examination  - BP: at goal  - BMI: Body mass index is 25.77 kg/m²., recommended low fat diet and aerobic exercise 30 minutes three times weekly.   - CBC With Differential With Platelet  - Comp Metabolic Panel (14)  - TSH W Reflex To Free T4  - Lipid Panel  - Urinalysis with Culture Reflex    2. Hypertension, unspecified type  Pt presents for follow up of HTN  - no red flag symptoms  - BP controlled  - continue current regimen  - RTC 6mo or sooner if needed    3. Fear of flying  Patient has fear of flying  - will continue xanax as needed for flying  - ALPRAZolam 0.5 MG Oral Tab; Take 1 tablet (0.5 mg total) by mouth daily as needed for Anxiety (flight).  Dispense: 30 tablet; Refill: 0    4. Chronic neck pain  Patient has chronic neck pain  - will check xr cervical spine  - if no contraindication on imaging will refer to physical therapy, appreciate evaluation and recommendations  - XR CERVICAL SPINE (4VIEWS) (CPT=72050); Future  - Physical Therapy Referral - [facility]        The patient indicates understanding of these issues and agrees to the plan.      Health maintenance, will check:   Orders Placed This Encounter   Procedures    CBC With Differential With Platelet    Comp Metabolic Panel (14)    TSH W Reflex To Free T4    Lipid Panel    Urinalysis with Culture Reflex               Encounter Diagnoses   Name Primary?    Wellness examination Yes    Hypertension, unspecified type     Fear of flying     Chronic neck pain        Meds & Refills for this Visit:  Requested Prescriptions     Signed Prescriptions Disp Refills    ALPRAZolam 0.5 MG Oral Tab 30 tablet 0      Sig: Take 1 tablet (0.5 mg total) by mouth daily as needed for Anxiety (flight).       Imaging & Consults:  OP REFERRAL TO [facility]  XR CERVICAL SPINE (4VIEWS) (CPT=72050)      Return in about 6 months (around 5/20/2025) for medication follow up, or sooner if needed.        Veronica La MD  [facility]  11/20/24      Please note that portions of this note may have been completed with a voice recognition program. Efforts were made to edit the dictations but occasionally words are mis-transcribed. Thank you for your understanding.    The 21st Century Cures Act makes medical notes like these available to patients in the interest of transparency. Please be advised this is a medical document. Medical documents are intended to carry relevant information, facts as evident, and the clinical opinion of the practitioner. The medical note is intended as peer to peer communication and may appear blunt or direct. It is written in medical language and may contain abbreviations or verbiage that are unfamiliar. If there are any questions or concerns please contact the provider for clarification.            [1]   Allergies  Allergen Reactions    Seasonal

## 2024-11-20 NOTE — PATIENT INSTRUCTIONS
Refill policies:      Allow 3 business days for refills; controlled substances may take longer.  Contact your pharmacy at least 5-7 business days prior to running out of medication and have them send an electronic request or submit through the \"request refill\" option thru your [employer] account. No need to do both, as multiple requests will create an automated [employer] message to notify of a denial for one of the duplicated requests, causing you undue confusion.   Refills are NOT addressed on weekends; covering physicians do not authorize routine medications on weekends.  No narcotics or controlled substances are refilled after noon on Fridays or by on call physicians.  By law, narcotics cannot be faxed or phoned into your pharmacy.  If your prescription is due for a refill, you may be due for a follow up appointment. Please call our office at [phone] to make an appointment or schedule an appointment via [employer].  To best provide you care, patients receiving routine medications need to be seen at least twice a year. Patients receiving narcotic/controlled substance medications need to be seen at least once every 3 months.  In the event that your preferred pharmacy does not have the requested medication in stock (ie Backordered), it is your responsibility to find another pharmacy that has the requested medication available. We will gladly send a new prescription to that pharmacy at your request.  controlled substances may not be able to be filled out of state due to license restrictions.  If you have a planned trip, it's best to call your pharmacy at least 5-7 business days to prevent any delays in your medication refill.    Scheduling Tests:    If your physician has ordered radiology tests such as MRI or CT scans, please contact Central Scheduling at [phone] right away to schedule the test.  Once scheduled, the [facility] Centralized Referral Team will work with your insurance carrier to obtain pre-certification or  prior authorization.  Depending on your insurance carrier, approval may take 3-10 days.  It is highly recommended patients assure they have received an authorization before having a test performed.  If test is done without insurance authorization, patient may be responsible for the entire amount billed.      Precertification and Prior Authorizations:  If your physician has recommended that you have a procedure or additional testing performed the [facility] Centralized Referral Team will contact your insurance carrier to obtain pre-certification or prior authorization.    You are strongly encouraged to contact your insurance carrier to verify that your procedure/test has been approved and is a COVERED benefit.  Although the [facility] Centralized Referral Team does its due diligence, the insurance carrier gives the disclaimer that \"Although the procedure is authorized, this does not guarantee payment.\"    Ultimately the patient is responsible for payment.   Thank you for your understanding in this matter.  Paperwork Completion:  If you require FMLA or disability paperwork for your recovery, please make sure to either drop it off or have it faxed to our office at [phone]. Be sure the form has your name and date of birth on it.  The form will be faxed to our Forms Department and they will complete it for you.  There is a 25$ fee for all forms that need to be filled out.  Please be aware there is a 10-14 day turnaround time.  You will need to sign a release of information (MAC) form if your paperwork does not come with one.  You may call the Forms Department with any questions at [phone].  Their fax number is [phone].

## 2024-11-22 DIAGNOSIS — F40.243 FEAR OF FLYING: ICD-10-CM

## 2024-11-22 DIAGNOSIS — I10 HYPERTENSION, UNSPECIFIED TYPE: ICD-10-CM

## 2024-11-25 PROBLEM — F40.243 FEAR OF FLYING: Status: ACTIVE | Noted: 2024-11-25

## 2024-11-25 RX ORDER — METOPROLOL SUCCINATE 50 MG/1
50 TABLET, EXTENDED RELEASE ORAL DAILY
Qty: 30 TABLET | Refills: 0 | Status: SHIPPED | OUTPATIENT
Start: 2024-11-25

## 2024-11-25 RX ORDER — ALPRAZOLAM 0.5 MG
0.5 TABLET ORAL DAILY PRN
Qty: 30 TABLET | Refills: 0 | Status: SHIPPED | OUTPATIENT
Start: 2024-11-25 | End: 2024-11-25 | Stop reason: CLARIF

## 2024-11-25 NOTE — TELEPHONE ENCOUNTER
Did not pass protocol  Last office visit 11/20/2024  Last refill was: , 11/20/2024__tabs  Next appointment: n/a    Please sign pended medication if appropriate            Medication Quantity Refills Start End   ALPRAZolam 0.5 MG Oral Tab (Discontinued) 30 tablet 0 11/25/2024 11/25/2024   Sig:   Take 1 tablet (0.5 mg total) by mouth daily as needed for Anxiety (flight).     Route:   Oral

## 2024-11-27 DIAGNOSIS — I10 HYPERTENSION, UNSPECIFIED TYPE: ICD-10-CM

## 2024-11-27 RX ORDER — AMLODIPINE BESYLATE 5 MG/1
5 TABLET ORAL DAILY
Qty: 90 TABLET | Refills: 1 | Status: SHIPPED | OUTPATIENT
Start: 2024-11-27

## 2024-12-13 DIAGNOSIS — I10 HYPERTENSION, UNSPECIFIED TYPE: ICD-10-CM

## 2024-12-13 RX ORDER — METOPROLOL SUCCINATE 50 MG/1
50 TABLET, EXTENDED RELEASE ORAL DAILY
Qty: 90 TABLET | Refills: 0 | Status: SHIPPED | OUTPATIENT
Start: 2024-12-13

## 2024-12-13 NOTE — TELEPHONE ENCOUNTER
Last office visit: 11/20/24  Next office visit: RTC in 6 months (5/20/25)  Last Refill:11/25/24    Requested Prescriptions     Pending Prescriptions Disp Refills    metoprolol succinate ER 50 MG Oral Tablet 24 Hr 30 tablet 0     Sig: Take 1 tablet (50 mg total) by mouth daily.     Hypertension Medications Protocol [id number]/2024 08:29 AM   Protocol Details CMP or BMP in past 12 months    Last BP reading less than 140/90    In person appointment or virtual visit in the past 12 mos or appointment in next 3 mos    EGFRCR or GFRNAA > 50

## 2025-01-03 ENCOUNTER — OFFICE VISIT (OUTPATIENT)
Dept: FAMILY MEDICINE CLINIC | Facility: CLINIC | Age: 25
End: 2025-01-03
Payer: COMMERCIAL

## 2025-01-03 VITALS
BODY MASS INDEX: 25 KG/M2 | TEMPERATURE: 98 F | DIASTOLIC BLOOD PRESSURE: 80 MMHG | OXYGEN SATURATION: 98 % | WEIGHT: 180 LBS | HEART RATE: 69 BPM | RESPIRATION RATE: 18 BRPM | SYSTOLIC BLOOD PRESSURE: 116 MMHG

## 2025-01-03 DIAGNOSIS — H66.92 ACUTE OTITIS MEDIA, LEFT: Primary | ICD-10-CM

## 2025-01-03 DIAGNOSIS — J02.9 SORE THROAT: ICD-10-CM

## 2025-01-03 LAB
CONTROL LINE PRESENT WITH A CLEAR BACKGROUND (YES/NO): YES YES/NO
STREP GRP A CUL-SCR: NEGATIVE

## 2025-01-03 PROCEDURE — 87637 SARSCOV2&INF A&B&RSV AMP PRB: CPT | Performed by: NURSE PRACTITIONER

## 2025-01-03 RX ORDER — AMOXICILLIN 875 MG/1
875 TABLET, COATED ORAL 2 TIMES DAILY
Qty: 20 TABLET | Refills: 0 | Status: SHIPPED | OUTPATIENT
Start: 2025-01-03 | End: 2025-01-13

## 2025-01-03 RX ORDER — FLUTICASONE PROPIONATE 50 MCG
2 SPRAY, SUSPENSION (ML) NASAL DAILY
Qty: 1 EACH | Refills: 0 | Status: SHIPPED | OUTPATIENT
Start: 2025-01-03 | End: 2025-01-17

## 2025-01-03 NOTE — PATIENT INSTRUCTIONS
It is very important to complete full course of antibiotic.   Covid/FLU/RSV sent to lab  Flonase   Mucinex DM as box instructions for cough  Acetaminophen or ibuprofen according to package instructions as needed for pain  Call or return if symptoms worsen, do not improve in 3 days, or if fever of 100.4 or greater persists for 72 hours.      Middle Ear Infection (Otitis Media)   What is a middle ear infection?   A middle ear infection is an infection of the air-filled space in the ear behind the eardrum. Anyone can get an ear infection, but ear infections are more common in children less than 8 years old.   How does it occur?   Ear infections usually begin with a viral infection of the nose and throat. For example, a cold might lead to an infection of the ear. Ear infections may also occur when you have allergies. The viral infection or allergic reaction can cause swelling of the tube between your ear and throat (the eustachian tube). The swelling may trap bacteria in your middle ear, resulting in a bacterial infection.   Pressure from the buildup of pus or fluid in the ear sometimes causes the eardrum to tear (rupture). The eardrum is a thin membrane that separates the delicate parts of the middle ear from the air and moisture in the ear canal.   What are the symptoms?   You may have one or more of these symptoms:   earache   hearing loss   feeling of blockage in the ear   fever   dizziness.   How is it diagnosed?   Your healthcare provider will ask about your symptoms and look at your eardrum.   Your healthcare provider may check for fluid in the ear using a light called an otoscope to look into the ear canal. A puff of air is blown into the ear and your provider looks for movement of the eardrum. If there is fluid behind the eardrum, the membrane will not move well.   How is it treated?   Antibiotic medicine is a common treatment for ear infections. However, recent studies have shown that the symptoms of ear  infections often go away in a couple of days without antibiotics. Bacteria can become resistant to antibiotics, and the medicine may cause side effects. For these reasons, your healthcare provider may wait 1 to 3 days to see if the symptoms go away on their own before prescribing an antibiotic.   Your provider may recommend a decongestant (tablets or a nasal spray) to help clear the eustachian tube. This may help relieve pressure in the middle ear. For pain take a nonprescription pain reliever such as acetaminophen (Tylenol) or ibuprofen. Carefully follow the directions for using medicines, even if they are nonprescription.   How long will the effects last?   In most cases you should feel better in 2 to 3 days.   If you are taking an antibiotic and your eardrum has not returned to normal when your provider examines you again, you may need to take a different antibiotic or other medicine. In this case, it may take another 1 to 2 weeks before your ear feels normal again.   What can I do to take care of myself?   Follow your healthcare provider's instructions.   If you are taking an antibiotic, take all of it according to the directions, even when the symptoms have gone away before you have finished it.   To help relieve pain, put a warm moist washcloth or a hot water bottle over the ear.   If you have discharge from your ear, you can wipe it away with a washcloth and loosely plug the ear with cotton to catch further drainage. If you have a lot of fluid and pus draining from your ear, the eardrum has probably ruptured. Ask your healthcare provider how to care for the ear if you have discharge. If the discharge is caused by a ruptured eardrum, then you will need to protect the ear from water. You will need one or more follow-up appointments to check the healing of your eardrum.   If you have a fever:   Rest until your temperature has fallen below 100°F (37.8°C). Then become as active as is comfortable.   Ask your  provider if you can take aspirin, acetaminophen, or ibuprofen to control your fever. Anyone under the age of 21 with a viral illness should not take aspirin because of the increased risk of Reye's syndrome.   Keep a daily record of your temperature.   Call your healthcare provider if you have:   a temperature of 101.5 degrees F (38.6 degrees C) or higher that persists even after you take acetaminophen, aspirin, or ibuprofen   a severe headache or worsening pain around the ear   swelling around the ear   increasing dizziness   worsening of your hearing   weakness of one side of your face.   Keep all your appointments. Your healthcare provider may want you to have one or more follow-up exams until signs of inflammation and infection have disappeared.   How can I prevent an ear infection from occurring?   If you tend to get ear infections often, ask your healthcare provider if you need to be checked for allergies. Getting treatment for allergies may help prevent ear infections.   Ask if using decongestants when you have a cold may help prevent you from getting ear infections.   Developed by [employer]   Published by [employer].   Last modified: 2008-01-15

## 2025-01-03 NOTE — PROGRESS NOTES
CHIEF COMPLAINT:     Chief Complaint   Patient presents with    Sinus Problem     Sinus congestion, headache,  fatigue, sore throat. Cough x Sunday       HPI:   Jesús Troy is a 24 year old male who presents for upper respiratory symptoms for  4 days. Patient reports congestion.  Associated symptoms include headache, fatigue, sinus congestion, sore throat, left ear discomfort.  Denies fever, chills, GI symptoms.   Symptoms have been persistent since onset.  Treating symptoms with Zicam, Vitamin C, airborne, cold OTC.     + hx of COVID; No COVID exposure  + flu vaccine, not recent Covid booster.     Current Outpatient Medications   Medication Sig Dispense Refill    metoprolol succinate ER 50 MG Oral Tablet 24 Hr Take 1 tablet (50 mg total) by mouth daily. 90 tablet 0    amLODIPine 5 MG Oral Tab Take 1 tablet (5 mg total) by mouth daily. 90 tablet 1    ALPRAZolam 0.5 MG Oral Tab Take 1 tablet (0.5 mg total) by mouth as needed for Anxiety (flying). 30 tablet 0    levocetirizine 5 MG Oral Tab Take 1 tablet (5 mg total) by mouth every evening. 30 tablet 0    fluticasone propionate 50 MCG/ACT Nasal Suspension 2 sprays by Each Nare route daily. (Patient not taking: Reported on 1/3/2025) 1 each 0      Past Medical History:    HTN (hypertension)      Past Surgical History:   Procedure Laterality Date    Injection, anesthetic/steroid, transforaminal epidural; lumbar/sacral, add'l level Left 10/27/2015    Procedure: LUMBAR / TRANSFORAMINAL EPIDURAL STEROID INJECTION;  Surgeon: Severino Ruvalcaba MD;  Location: [facility]    Injection, anesthetic/steroid, transforaminal epidural; lumbar/sacral, add'l level Left 11/19/2015    Procedure: LUMBAR / TRANSFORAMINAL EPIDURAL STEROID INJECTION;  Surgeon: Severino Ruvalcaba MD;  Location: [facility]    Injection, anesthetic/steroid, transforaminal epidural; lumbar/sacral, single level Left 10/27/2015    Procedure: LUMBAR / TRANSFORAMINAL EPIDURAL STEROID INJECTION;   Surgeon: Severino Ruvalcaba MD;  Location: [facility]    Injection, anesthetic/steroid, transforaminal epidural; lumbar/sacral, single level Left 11/19/2015    Procedure: LUMBAR / TRANSFORAMINAL EPIDURAL STEROID INJECTION;  Surgeon: Severino Ruvalcaba MD;  Location: [facility]         Social History     Socioeconomic History    Marital status: Single   Tobacco Use    Smoking status: Never     Passive exposure: Past    Smokeless tobacco: Never   Substance and Sexual Activity    Alcohol use: Yes     Comment: couple drinks on the weekends    Drug use: Yes   Other Topics Concern    Caffeine Concern No    Exercise Yes     Comment: cardio, weight training    Seat Belt Yes         REVIEW OF SYSTEMS:   GENERAL: feels well otherwise,   OK appetite  SKIN: no rashes or abnormal skin lesions  HEENT: See HPI  LUNGS: denies shortness of breath or wheezing, See HPI  CARDIOVASCULAR: denies chest pain or palpitations   GI: denies N/V/C or abdominal pain  NEURO: headaches    EXAM:   /80   Pulse 69   Temp 97.9 °F (36.6 °C) (Oral)   Resp 18   Wt 180 lb (81.6 kg)   SpO2 98%   BMI 25.10 kg/m²   GENERAL: well developed, well nourished, in no apparent distress  SKIN: no rashes,no suspicious lesions  HEAD: atraumatic, normocephalic.  No tenderness on palpation of sinuses  EYES: conjunctiva clear  EARS: Left TM erythematous, + bulging, no retraction, no fluid, bony landmarks not visualized.  Right TM pinkish clear, no bulging, no retraction,+ fluid, bony landmarks visualized  NOSE: Nostrils patent, clear nasal discharge, nasal mucosa pink and not inflamed  THROAT: Oral mucosa pink, moist. Posterior pharynx is mildly erythematous. No exudates. Tonsils 1/4.    NECK: Supple, non-tender  LUNGS: clear to auscultation bilaterally; good air movement.  Breathing is non labored.  CARDIO: RRR without murmur  EXTREMITIES: no cyanosis, clubbing or edema  LYMPH:  + left anterior cervical lymphadenopathy.        ASSESSMENT AND  PLAN:   Jesús Troy is a 24 year old male who presents with     ASSESSMENT:   Encounter Diagnoses   Name Primary?    Acute otitis media, left Yes    Sore throat        PLAN:   Covid/FLU/RSV sent to lab  Meds as listed below.  Tylenol/Motrin prn pain.    Risks, benefits, and side effects of medication explained and discussed.  Comfort measures as described in Patient Instructions.    Discussed S&S that require follow-up.       Meds & Refills for this Visit:  Requested Prescriptions     Signed Prescriptions Disp Refills    amoxicillin 875 MG Oral Tab 20 tablet 0     Sig: Take 1 tablet (875 mg total) by mouth 2 (two) times daily for 10 days.    fluticasone propionate 50 MCG/ACT Nasal Suspension 1 each 0     Si sprays by Each Nare route daily for 14 days.           Patient Instructions   It is very important to complete full course of antibiotic.   Covid/FLU/RSV sent to lab  Flonase   Mucinex DM as box instructions for cough  Acetaminophen or ibuprofen according to package instructions as needed for pain  Call or return if symptoms worsen, do not improve in 3 days, or if fever of 100.4 or greater persists for 72 hours.      Middle Ear Infection (Otitis Media)   What is a middle ear infection?   A middle ear infection is an infection of the air-filled space in the ear behind the eardrum. Anyone can get an ear infection, but ear infections are more common in children less than 8 years old.   How does it occur?   Ear infections usually begin with a viral infection of the nose and throat. For example, a cold might lead to an infection of the ear. Ear infections may also occur when you have allergies. The viral infection or allergic reaction can cause swelling of the tube between your ear and throat (the eustachian tube). The swelling may trap bacteria in your middle ear, resulting in a bacterial infection.   Pressure from the buildup of pus or fluid in the ear sometimes causes the eardrum to tear (rupture). The  eardrum is a thin membrane that separates the delicate parts of the middle ear from the air and moisture in the ear canal.   What are the symptoms?   You may have one or more of these symptoms:   earache   hearing loss   feeling of blockage in the ear   fever   dizziness.   How is it diagnosed?   Your healthcare provider will ask about your symptoms and look at your eardrum.   Your healthcare provider may check for fluid in the ear using a light called an otoscope to look into the ear canal. A puff of air is blown into the ear and your provider looks for movement of the eardrum. If there is fluid behind the eardrum, the membrane will not move well.   How is it treated?   Antibiotic medicine is a common treatment for ear infections. However, recent studies have shown that the symptoms of ear infections often go away in a couple of days without antibiotics. Bacteria can become resistant to antibiotics, and the medicine may cause side effects. For these reasons, your healthcare provider may wait 1 to 3 days to see if the symptoms go away on their own before prescribing an antibiotic.   Your provider may recommend a decongestant (tablets or a nasal spray) to help clear the eustachian tube. This may help relieve pressure in the middle ear. For pain take a nonprescription pain reliever such as acetaminophen (Tylenol) or ibuprofen. Carefully follow the directions for using medicines, even if they are nonprescription.   How long will the effects last?   In most cases you should feel better in 2 to 3 days.   If you are taking an antibiotic and your eardrum has not returned to normal when your provider examines you again, you may need to take a different antibiotic or other medicine. In this case, it may take another 1 to 2 weeks before your ear feels normal again.   What can I do to take care of myself?   Follow your healthcare provider's instructions.   If you are taking an antibiotic, take all of it according to the  directions, even when the symptoms have gone away before you have finished it.   To help relieve pain, put a warm moist washcloth or a hot water bottle over the ear.   If you have discharge from your ear, you can wipe it away with a washcloth and loosely plug the ear with cotton to catch further drainage. If you have a lot of fluid and pus draining from your ear, the eardrum has probably ruptured. Ask your healthcare provider how to care for the ear if you have discharge. If the discharge is caused by a ruptured eardrum, then you will need to protect the ear from water. You will need one or more follow-up appointments to check the healing of your eardrum.   If you have a fever:   Rest until your temperature has fallen below 100°F (37.8°C). Then become as active as is comfortable.   Ask your provider if you can take aspirin, acetaminophen, or ibuprofen to control your fever. Anyone under the age of 21 with a viral illness should not take aspirin because of the increased risk of Reye's syndrome.   Keep a daily record of your temperature.   Call your healthcare provider if you have:   a temperature of 101.5 degrees F (38.6 degrees C) or higher that persists even after you take acetaminophen, aspirin, or ibuprofen   a severe headache or worsening pain around the ear   swelling around the ear   increasing dizziness   worsening of your hearing   weakness of one side of your face.   Keep all your appointments. Your healthcare provider may want you to have one or more follow-up exams until signs of inflammation and infection have disappeared.   How can I prevent an ear infection from occurring?   If you tend to get ear infections often, ask your healthcare provider if you need to be checked for allergies. Getting treatment for allergies may help prevent ear infections.   Ask if using decongestants when you have a cold may help prevent you from getting ear infections.   Developed by [employer]   Published by [employer].    Last modified: 2008-01-15        The patient indicates understanding of these issues and agrees to the plan.

## 2025-01-04 LAB
FLUAV + FLUBV RNA SPEC NAA+PROBE: NOT DETECTED
FLUAV + FLUBV RNA SPEC NAA+PROBE: NOT DETECTED
RSV RNA SPEC NAA+PROBE: NOT DETECTED
SARS-COV-2 RNA RESP QL NAA+PROBE: NOT DETECTED

## 2025-02-17 ENCOUNTER — APPOINTMENT (OUTPATIENT)
Dept: GENERAL RADIOLOGY | Facility: HOSPITAL | Age: 25
End: 2025-02-17
Attending: STUDENT IN AN ORGANIZED HEALTH CARE EDUCATION/TRAINING PROGRAM
Payer: COMMERCIAL

## 2025-02-17 ENCOUNTER — HOSPITAL ENCOUNTER (EMERGENCY)
Facility: HOSPITAL | Age: 25
Discharge: HOME | End: 2025-02-17
Attending: STUDENT IN AN ORGANIZED HEALTH CARE EDUCATION/TRAINING PROGRAM
Payer: COMMERCIAL

## 2025-02-17 ENCOUNTER — HOSPITAL ENCOUNTER (OUTPATIENT)
Age: 25
Discharge: ANOTHER HOSPITAL | End: 2025-02-17
Payer: COMMERCIAL

## 2025-02-17 ENCOUNTER — PATIENT MESSAGE (OUTPATIENT)
Dept: FAMILY MEDICINE CLINIC | Facility: CLINIC | Age: 25
End: 2025-02-17

## 2025-02-17 ENCOUNTER — APPOINTMENT (OUTPATIENT)
Dept: ULTRASOUND IMAGING | Facility: HOSPITAL | Age: 25
End: 2025-02-17
Attending: STUDENT IN AN ORGANIZED HEALTH CARE EDUCATION/TRAINING PROGRAM
Payer: COMMERCIAL

## 2025-02-17 VITALS
RESPIRATION RATE: 16 BRPM | TEMPERATURE: 99 F | SYSTOLIC BLOOD PRESSURE: 155 MMHG | HEART RATE: 72 BPM | OXYGEN SATURATION: 99 % | DIASTOLIC BLOOD PRESSURE: 82 MMHG

## 2025-02-17 VITALS
OXYGEN SATURATION: 98 % | DIASTOLIC BLOOD PRESSURE: 81 MMHG | RESPIRATION RATE: 19 BRPM | SYSTOLIC BLOOD PRESSURE: 137 MMHG | TEMPERATURE: 98 F | HEART RATE: 69 BPM

## 2025-02-17 DIAGNOSIS — R10.10 UPPER ABDOMINAL PAIN: Primary | ICD-10-CM

## 2025-02-17 DIAGNOSIS — K76.9 LESION OF LIVER: ICD-10-CM

## 2025-02-17 DIAGNOSIS — R10.12 ABDOMINAL PAIN, LEFT UPPER QUADRANT: Primary | ICD-10-CM

## 2025-02-17 LAB
ALBUMIN SERPL-MCNC: 5.1 G/DL (ref 3.2–4.8)
ALP LIVER SERPL-CCNC: 84 U/L
ALT SERPL-CCNC: 54 U/L
ANION GAP SERPL CALC-SCNC: 9 MMOL/L (ref 0–18)
AST SERPL-CCNC: 27 U/L (ref ?–34)
BASOPHILS # BLD AUTO: 0.05 X10(3) UL (ref 0–0.2)
BASOPHILS NFR BLD AUTO: 0.8 %
BILIRUB DIRECT SERPL-MCNC: 0.1 MG/DL (ref ?–0.3)
BILIRUB SERPL-MCNC: 0.5 MG/DL (ref 0.3–1.2)
BUN BLD-MCNC: 12 MG/DL (ref 9–23)
BUN/CREAT SERPL: 11.8 (ref 10–20)
CALCIUM BLD-MCNC: 9.4 MG/DL (ref 8.7–10.4)
CHLORIDE SERPL-SCNC: 102 MMOL/L (ref 98–112)
CO2 SERPL-SCNC: 29 MMOL/L (ref 21–32)
CREAT BLD-MCNC: 1.02 MG/DL
DEPRECATED RDW RBC AUTO: 39.6 FL (ref 35.1–46.3)
EGFRCR SERPLBLD CKD-EPI 2021: 104 ML/MIN/1.73M2 (ref 60–?)
EOSINOPHIL # BLD AUTO: 0.18 X10(3) UL (ref 0–0.7)
EOSINOPHIL NFR BLD AUTO: 2.8 %
ERYTHROCYTE [DISTWIDTH] IN BLOOD BY AUTOMATED COUNT: 12.4 % (ref 11–15)
GLUCOSE BLD-MCNC: 97 MG/DL (ref 70–99)
HCT VFR BLD AUTO: 44.5 %
HGB BLD-MCNC: 14.9 G/DL
IMM GRANULOCYTES # BLD AUTO: 0.01 X10(3) UL (ref 0–1)
IMM GRANULOCYTES NFR BLD: 0.2 %
LIPASE SERPL-CCNC: 66 U/L (ref 12–53)
LYMPHOCYTES # BLD AUTO: 1.98 X10(3) UL (ref 1–4)
LYMPHOCYTES NFR BLD AUTO: 30.3 %
MCH RBC QN AUTO: 29.4 PG (ref 26–34)
MCHC RBC AUTO-ENTMCNC: 33.5 G/DL (ref 31–37)
MCV RBC AUTO: 87.8 FL
MONOCYTES # BLD AUTO: 0.62 X10(3) UL (ref 0.1–1)
MONOCYTES NFR BLD AUTO: 9.5 %
NEUTROPHILS # BLD AUTO: 3.7 X10 (3) UL (ref 1.5–7.7)
NEUTROPHILS # BLD AUTO: 3.7 X10(3) UL (ref 1.5–7.7)
NEUTROPHILS NFR BLD AUTO: 56.4 %
OSMOLALITY SERPL CALC.SUM OF ELEC: 290 MOSM/KG (ref 275–295)
PLATELET # BLD AUTO: 218 10(3)UL (ref 150–450)
POTASSIUM SERPL-SCNC: 4.1 MMOL/L (ref 3.5–5.1)
PROT SERPL-MCNC: 7.4 G/DL (ref 5.7–8.2)
RBC # BLD AUTO: 5.07 X10(6)UL
SODIUM SERPL-SCNC: 140 MMOL/L (ref 136–145)
WBC # BLD AUTO: 6.5 X10(3) UL (ref 4–11)

## 2025-02-17 PROCEDURE — 76705 ECHO EXAM OF ABDOMEN: CPT | Performed by: STUDENT IN AN ORGANIZED HEALTH CARE EDUCATION/TRAINING PROGRAM

## 2025-02-17 PROCEDURE — 36415 COLL VENOUS BLD VENIPUNCTURE: CPT

## 2025-02-17 PROCEDURE — 80076 HEPATIC FUNCTION PANEL: CPT | Performed by: STUDENT IN AN ORGANIZED HEALTH CARE EDUCATION/TRAINING PROGRAM

## 2025-02-17 PROCEDURE — 99214 OFFICE O/P EST MOD 30 MIN: CPT

## 2025-02-17 PROCEDURE — 99284 EMERGENCY DEPT VISIT MOD MDM: CPT

## 2025-02-17 PROCEDURE — 80048 BASIC METABOLIC PNL TOTAL CA: CPT | Performed by: STUDENT IN AN ORGANIZED HEALTH CARE EDUCATION/TRAINING PROGRAM

## 2025-02-17 PROCEDURE — 85025 COMPLETE CBC W/AUTO DIFF WBC: CPT | Performed by: STUDENT IN AN ORGANIZED HEALTH CARE EDUCATION/TRAINING PROGRAM

## 2025-02-17 PROCEDURE — 93005 ELECTROCARDIOGRAM TRACING: CPT

## 2025-02-17 PROCEDURE — 71045 X-RAY EXAM CHEST 1 VIEW: CPT | Performed by: STUDENT IN AN ORGANIZED HEALTH CARE EDUCATION/TRAINING PROGRAM

## 2025-02-17 PROCEDURE — 99285 EMERGENCY DEPT VISIT HI MDM: CPT

## 2025-02-17 PROCEDURE — 83690 ASSAY OF LIPASE: CPT | Performed by: STUDENT IN AN ORGANIZED HEALTH CARE EDUCATION/TRAINING PROGRAM

## 2025-02-17 RX ORDER — SUCRALFATE 1 G/1
1 TABLET ORAL
Qty: 45 TABLET | Refills: 0 | Status: SHIPPED | OUTPATIENT
Start: 2025-02-17

## 2025-02-17 NOTE — TELEPHONE ENCOUNTER
I called and spoke to the patient. He has had left lower rib pain for 4-5 days. No known injury. He has noticed an increase in belching and acid reflux. He started to take over the counter Omeprazole 1 daily for the past 3-4 days with no relief. He has no fever, no nausea or vomiting. No diarrhea or constipation. He did state he may have noticed he has had a little less of a bowel movement but said he is not always regular. I asked if it was truly rib pain or a little lower in the left lower quadrant of the abdomen. Patient stated \" it may be a little lower.\"

## 2025-02-17 NOTE — TELEPHONE ENCOUNTER
I called the patient and gave him the instruction to got to the IC or the ER today. Pt. Agreed to plan and verbalized understanding

## 2025-02-18 ENCOUNTER — TELEPHONE (OUTPATIENT)
Dept: FAMILY MEDICINE CLINIC | Facility: CLINIC | Age: 25
End: 2025-02-18

## 2025-02-18 LAB
ATRIAL RATE: 71 BPM
P AXIS: 28 DEGREES
P-R INTERVAL: 150 MS
Q-T INTERVAL: 378 MS
QRS DURATION: 92 MS
QTC CALCULATION (BEZET): 410 MS
R AXIS: 60 DEGREES
T AXIS: 32 DEGREES
VENTRICULAR RATE: 71 BPM

## 2025-02-18 NOTE — ED PROVIDER NOTES
Patient Seen in: [facility] Emergency Department      History     Chief Complaint   Patient presents with    Abdominal Pain     Stated Complaint: LUQ pain, GERD    Subjective:   HPI      24-year-old male with history of hypertension presenting with upper abdominal and left lower chest wall pain.  Onset of symptoms 1 week ago.  Describes pain as sharp, occasionally worse with deep breathing.  No particular worsened with eating.  Thought it was GERD so started taking omeprazole, but has not had relief of symptoms.  Call PCP today who recommended IC evaluation, with ICU but was told to come to the ER due to need for imaging and the fact that they were closing. No fever or chills, no urinary symptoms, no hx of abdominal surgeries. No hormone containing medication use, no recent travel, surgery, no hx dvt/pe.     Objective:     Past Medical History:    HTN (hypertension)              Past Surgical History:   Procedure Laterality Date    Injection, anesthetic/steroid, transforaminal epidural; lumbar/sacral, add'l level Left 10/27/2015    Procedure: LUMBAR / TRANSFORAMINAL EPIDURAL STEROID INJECTION;  Surgeon: Severino Ruvalcaba MD;  Location: [facility]    Injection, anesthetic/steroid, transforaminal epidural; lumbar/sacral, add'l level Left 11/19/2015    Procedure: LUMBAR / TRANSFORAMINAL EPIDURAL STEROID INJECTION;  Surgeon: Severino Ruvalcaba MD;  Location: [facility]    Injection, anesthetic/steroid, transforaminal epidural; lumbar/sacral, single level Left 10/27/2015    Procedure: LUMBAR / TRANSFORAMINAL EPIDURAL STEROID INJECTION;  Surgeon: Severino Ruvalcaba MD;  Location: [facility]    Injection, anesthetic/steroid, transforaminal epidural; lumbar/sacral, single level Left 11/19/2015    Procedure: LUMBAR / TRANSFORAMINAL EPIDURAL STEROID INJECTION;  Surgeon: Severino Ruvalcaba MD;  Location: [facility]                Social History     Socioeconomic History    Marital status:  Single   Tobacco Use    Smoking status: Never     Passive exposure: Past    Smokeless tobacco: Never   Substance and Sexual Activity    Alcohol use: Yes     Comment: couple drinks on the weekends    Drug use: Yes     Types: Cannabis   Other Topics Concern    Caffeine Concern No    Exercise Yes     Comment: cardio, weight training    Seat Belt Yes                  Physical Exam     ED Triage Vitals   BP 02/17/25 1855 (!) 146/93   Pulse 02/17/25 1855 72   Resp 02/17/25 1855 20   Temp 02/17/25 1855 98.4 °F (36.9 °C)   Temp src 02/17/25 1855 Oral   SpO2 02/17/25 1855 97 %   O2 Device 02/17/25 2124 None (Room air)       Current Vitals:   Vital Signs  BP: 137/81  Pulse: 69  Resp: 19  Temp: 98.4 °F (36.9 °C)  Temp src: Oral  MAP (mmHg): 92    Oxygen Therapy  SpO2: 98 %  O2 Device: None (Room air)        Physical Exam  Constitutional: awake, alert, no sig distress  HENT: mmm, no lesions,  Neck: normal range of motion, no tenderness, supple.  Eyes: PERRL, EOMI, conjunctiva normal, no discharge. Sclera anicteric.  Cardiovascular: rr no murmur  Respiratory: Normal breath sounds, no respiratory distress, no wheezing, no chest tenderness.  GI: Bowel sounds normal, Soft, TTP LUQ, no masses, no pulsatile masses.  : No CVA tenderness.  Skin: Warm, dry, no erythema, no rash.  Musculoskeletal: Intact distal pulses, no edema, no tenderness, no cyanosis, no clubbing. Good range of motion in all major joints. No tenderness to palpation or major deformities noted. Back- No tenderness.  Neurologic: Alert & oriented x 3, normal motor function, normal sensory function, no focal deficits noted.  Psych: Calm, cooperative, nl affect        ED Course     Labs Reviewed   HEPATIC FUNCTION PANEL (7) - Abnormal; Notable for the following components:       Result Value    ALT 54 (*)     Albumin 5.1 (*)     All other components within normal limits   LIPASE - Abnormal; Notable for the following components:    Lipase 66 (*)     All other components  within normal limits   BASIC METABOLIC PANEL (8) - Normal   CBC WITH DIFFERENTIAL WITH PLATELET       ED Course as of 02/18/25 2057  ------------------------------------------------------------  Time: 02/17 2015  Comment: I have independently reviewed patient's chest x-ray do not appreciate any acute cardiopulmonary findings.              MDM      24M hx as above presenting with LUQ pain x1wk. On arrival vss, reassuring  Ddx: GERD/Gastritis, Pancreatitis, Sx cholelithiasis, Acute cholecystitis, PNA, MSK Pain  Consider PE however PERC negative, low risk by wells  Plan: CXR, labs, RUQUS    Labs reviewed no acute findings.  No leukocytosis or anemia.  Metabolic panel normal.    Reviewed ultrasound with patient, we discussed liver lesion, he understands the need for outpatient MRI abdomen.      Return precautions and follow-up instructions were discussed with patient who voiced understanding and agreement the plan.  All questions were answered to patient satisfaction.        Medical Decision Making      Disposition and Plan     Clinical Impression:  1. Upper abdominal pain    2. Lesion of liver         Disposition:  Discharge  2/17/2025  9:37 pm    Follow-up:  Veronica La MD  [address] [place] [address]  [facility] [id number]  [phone]    Follow up      [facility] Emergency Department  [address]  [facility] [id number] [phone]  Follow up  As needed, If symptoms worsen    Blane Persaud MD  [address] [facility] [address]  [facility] [id number] [phone]    Follow up            Medications Prescribed:  Discharge Medication List as of 2/17/2025  9:41 PM        START taking these medications    Details   sucralfate 1 g Oral Tab Take 1 tablet (1 g total) by mouth 4 (four) times daily before meals and nightly., Normal, Disp-45 tablet, R-0                 Supplementary Documentation:

## 2025-02-18 NOTE — ED INITIAL ASSESSMENT (HPI)
Presents with 1 week of GERD symptoms. High stress at work. LUQ pain. Pain is a constant 3/10 but goes to 10/10 in waves. Started Omeprazole 3-4 days ago. Describes pain as sharp and stabbing \"like a broken rib\". No trauma. No cough. Denies nausea. Eating and drinking normally. Last BM today and \"normal\".

## 2025-02-18 NOTE — ED PROVIDER NOTES
Patient Seen in: [facility]      History     Chief Complaint   Patient presents with    Epigastric Pain     Stated Complaint: abd pain    Subjective:   HPI    24-year-old male presents with complaints of abdominal pain.  He reports the pain predominantly in his left upper quadrant.  He states he is also had increased symptoms of GERD.  He has been trying omeprazole but states it feels like it is getting worse.  His symptoms have been for 1 week.  He called his primary Doctor today and was instructed to go to immediate care.  There has been no vomiting.  There is no diarrhea.  He states his bowel movements appear to be normal.      Objective:     Past Medical History:    HTN (hypertension)              Past Surgical History:   Procedure Laterality Date    Injection, anesthetic/steroid, transforaminal epidural; lumbar/sacral, add'l level Left 10/27/2015    Procedure: LUMBAR / TRANSFORAMINAL EPIDURAL STEROID INJECTION;  Surgeon: Severino Ruvalcaba MD;  Location: [facility]    Injection, anesthetic/steroid, transforaminal epidural; lumbar/sacral, add'l level Left 11/19/2015    Procedure: LUMBAR / TRANSFORAMINAL EPIDURAL STEROID INJECTION;  Surgeon: Severino Ruvalcaba MD;  Location: [facility]    Injection, anesthetic/steroid, transforaminal epidural; lumbar/sacral, single level Left 10/27/2015    Procedure: LUMBAR / TRANSFORAMINAL EPIDURAL STEROID INJECTION;  Surgeon: Severino Ruvalcaba MD;  Location: [facility]    Injection, anesthetic/steroid, transforaminal epidural; lumbar/sacral, single level Left 11/19/2015    Procedure: LUMBAR / TRANSFORAMINAL EPIDURAL STEROID INJECTION;  Surgeon: Severino Ruvalcaba MD;  Location: [facility]                Social History     Socioeconomic History    Marital status: Single   Tobacco Use    Smoking status: Never     Passive exposure: Past    Smokeless tobacco: Never   Substance and Sexual Activity    Alcohol use: Yes     Comment: couple drinks  on the weekends    Drug use: Yes     Types: Cannabis   Other Topics Concern    Caffeine Concern No    Exercise Yes     Comment: cardio, weight training    Seat Belt Yes              Review of Systems    Positive for stated complaint: abd pain  Other systems are as noted in HPI.  Constitutional and vital signs reviewed.      All other systems reviewed and negative except as noted above.    Physical Exam     ED Triage Vitals [02/17/25 1803]   /82   Pulse 72   Resp 16   Temp 98.6 °F (37 °C)   Temp src Oral   SpO2 99 %   O2 Device None (Room air)       Current Vitals:   No data recorded      Physical Exam  Vitals reviewed.   Constitutional:       General: He is not in acute distress.     Appearance: He is not ill-appearing.   HENT:      Mouth/Throat:      Mouth: Mucous membranes are moist.   Cardiovascular:      Rate and Rhythm: Normal rate and regular rhythm.   Pulmonary:      Effort: Pulmonary effort is normal.      Breath sounds: Normal breath sounds.   Abdominal:      General: There is no distension.      Palpations: Abdomen is soft.      Tenderness: There is abdominal tenderness in the left upper quadrant.   Musculoskeletal:         General: Normal range of motion.   Skin:     General: Skin is warm and dry.   Neurological:      General: No focal deficit present.      Mental Status: He is alert and oriented to person, place, and time.   Psychiatric:         Mood and Affect: Mood normal.         Behavior: Behavior normal.             ED Course   Labs Reviewed - No data to display  EKG      Basic Metabolic Panel (8)        Component Value Flag Ref Range Units Status    Glucose 97      70 - 99 mg/dL Final    Sodium 140      136 - 145 mmol/L Final    Potassium 4.1      3.5 - 5.1 mmol/L Final    Chloride 102      98 - 112 mmol/L Final    CO2 29.0      21.0 - 32.0 mmol/L Final    Anion Gap 9      0 - 18 mmol/L Final    BUN 12      9 - 23 mg/dL Final    Creatinine 1.02      0.70 - 1.30 mg/dL Final    BUN/CREA Ratio  11.8      10.0 - 20.0  Final    Calcium, Total 9.4      8.7 - 10.4 mg/dL Final    Calculated Osmolality 290      275 - 295 mOsm/kg Final    eGFR-Cr 104      >=60 mL/min/1.73m2 Final                  Hepatic Function Panel (7)        Component Value Flag Ref Range Units Status    AST 27      <34 U/L Final    ALT 54      10 - 49 U/L Final    Alkaline Phosphatase 84      45 - 117 U/L Final    Bilirubin, Total 0.5      0.3 - 1.2 mg/dL Final    Bilirubin, Direct 0.1      <=0.3 mg/dL Final    Total Protein 7.4      5.7 - 8.2 g/dL Final    Albumin 5.1      3.2 - 4.8 g/dL Final                  CBC With Differential With Platelet        Component Value Flag Ref Range Units Status    WBC 6.5      4.0 - 11.0 x10(3) uL Final    RBC 5.07      4.30 - 5.70 x10(6)uL Final    HGB 14.9      13.0 - 17.5 g/dL Final    HCT 44.5      39.0 - 53.0 % Final    MCV 87.8      80.0 - 100.0 fL Final    MCH 29.4      26.0 - 34.0 pg Final    MCHC 33.5      31.0 - 37.0 g/dL Final    RDW-SD 39.6      35.1 - 46.3 fL Final    RDW 12.4      11.0 - 15.0 % Final    .0      150.0 - 450.0 10(3)uL Final    Neutrophil Absolute Prelim 3.70      1.50 - 7.70 x10 (3) uL Final    Neutrophil Absolute 3.70      1.50 - 7.70 x10(3) uL Final    Lymphocyte Absolute 1.98      1.00 - 4.00 x10(3) uL Final    Monocyte Absolute 0.62      0.10 - 1.00 x10(3) uL Final    Eosinophil Absolute 0.18      0.00 - 0.70 x10(3) uL Final    Basophil Absolute 0.05      0.00 - 0.20 x10(3) uL Final    Immature Granulocyte Absolute 0.01      0.00 - 1.00 x10(3) uL Final    Neutrophil % 56.4       % Final    Lymphocyte % 30.3       % Final    Monocyte % 9.5       % Final    Eosinophil % 2.8       % Final    Basophil % 0.8       % Final    Immature Granulocyte % 0.2       % Final                  Lipase        Component Value Flag Ref Range Units Status    Lipase 66      12 - 53 U/L Final                  XR CHEST AP PORTABLE  (CPT=17450)          PROCEDURE: XR CHEST AP PORTABLE   (CPT=71045)  TIME: 1956 hours.       COMPARISON: None.     INDICATIONS: Left-sided pain, GERD, 6 days.     TECHNIQUE:   Single view.       FINDINGS:   CARDIAC/VASC: Heart size and pulmonary vascularity normal.   MEDIAST/HUBERT:   No visible mass or adenopathy.  LUNGS/PLEURA: No consolidation or pleural effusion.  BONES: No fracture or visible bony lesion.  OTHER: Negative.                =====  CONCLUSION: Normal examination.             Dictated by (CST): Manuelito Sandoval MD on 2/17/2025 at 8:18 PM       Finalized by (CST): Manuelito Sandoval MD on 2/17/2025 at 8:19 PM                 US GALLBLADDER (CPT=76705)          PROCEDURE: US GALLBLADDER (CPT=76705)     COMPARISON: None.     INDICATIONS: Epigastric pain. LUQ pain, GERD     TECHNIQUE:   The gallbladder was evaluated with grayscale ultrasound.       FINDINGS:   GALLBLADDER: Normal.  No gallstone.  BILE DUCTS: No dilatation.  Common bile duct measures 2.7 mm.    LIVER: Generalized increase in hepatic echogenicity.  A hypoechoic 7.1 x 4.7 x 5.7 cm right hepatic mass with internal vascularity.  Portal vein patent with proper direction of flow.  PANCREAS: Visualized portion of pancreatic body is normal.     Majority of pancreas is obscured by bowel gas.  OTHER: Survey view of the right kidney reveals no hydronephrosis.              =====  CONCLUSION:   1. Normal gallbladder.  2. A 7.1 cm right hepatic mass.  Follow-up nonemergent MRI with and without intravenous gadolinium recommended for further evaluation.  3. Hepatic steatosis.             Dictated by (CST): Manuelito Sandoval MD on 2/17/2025 at 9:31 PM       Finalized by (CST): Manuelito Sandoval MD on 2/17/2025 at 9:35 PM                 EKG 12 Lead        Component Value Flag Ref Range Units Status    Ventricular rate 71       BPM Final    Atrial rate 71       BPM Final    P-R Interval 150       ms Final    QRS Duration 92       ms Final    Q-T Interval 378       ms Final    QTC Calculation (Bezet) 410       ms Final    P  Axis 28       degrees Final    R Axis 60       degrees Final    T Axis 32       degrees Final                 Normal sinus rhythm with sinus arrhythmia  Normal ECG  When compared with ECG of 12-APR-2017 23:53,  Nonspecific T wave abnormality now evident in Inferior leads  Confirmed by EVI VIGIL JORDAN ([id number]) on 2/18/2025 10:09:25 AM                            [facility]              Medical Decision Making  24-year-old male presents with left upper quadrant abdominal pain.  Differential diagnosis includes GERD, diverticulitis, peptic ulcer disease, intra-abdominal pathology.  Patient states he has had increased GERD symptoms.  He started taking omeprazole he states with no relief in fact feels like its gotten worse.  There is no nausea.  There is no vomiting.  There is no diarrhea.  EKG was done given the location of pain.  It showed normal sinus rhythm with a rate of 71.  Further evaluation and treatment options were discussed with Dr. Monaco.  He agrees patient needs to be transferred to the emergency department for further evaluation and treatment.  Given time constraints we are unable to perform imaging here.  Patient was agreeable and will go to [facility] emergency department in private vehicle.    Amount and/or Complexity of Data Reviewed  ECG/medicine tests: ordered.     Details: EKG shows NSR rate is 71,  There is no prior in MUSE        Disposition and Plan     Clinical Impression:  1. Abdominal pain, left upper quadrant         Disposition:  Ic to ed  2/17/2025  6:24 pm    Follow-up:  No follow-up provider specified.        Medications Prescribed:  Discharge Medication List as of 2/17/2025  6:27 PM              Supplementary Documentation:

## 2025-02-18 NOTE — ED QUICK NOTES
Assumed care. Rounding Completed    Plan of Care reviewed. Waiting for imaging read.  Elimination needs assessed.      Bed is locked and in lowest position. Call light within reach.

## 2025-02-18 NOTE — ED INITIAL ASSESSMENT (HPI)
Pt to ed w/c of LUQ/epigastric pain for over a week. Pt states he recently began taking omeprazole without relief. Pt states calling his PCP who recommended him to go to IC but was deferred to our ED due to them closing. Pt in no distress. States pain has gotten worse today.

## 2025-02-18 NOTE — TELEPHONE ENCOUNTER
Patient called to give updates after being recommended to go to [facility] yesterday for LUQ abdominal pain.    Per patient, he arrived too late at [facility] and went to the ER instead. He went to [place] [facility] and had an US of the gallbladder done, which shows a 7.1cm right hepatic mass.     Patient was advised to contact his PCP to have an MRI with and without contrast. Patient is wondering if this order can be placed, or if he needs to be seen? Just in case, he scheduled an appointment on 2/20/25, but is unsure if he can keep that appointment due to work.

## 2025-02-19 NOTE — TELEPHONE ENCOUNTER
Patient called back and does not think he can make the appointment scheduled tomorrow on 2/20/25.     He is asking for any other available appointment soon.

## 2025-02-19 NOTE — TELEPHONE ENCOUNTER
Thank you for the update. Let's keep that upcoming appointment to discuss and document. Thank you.

## 2025-02-27 ENCOUNTER — LAB ENCOUNTER (OUTPATIENT)
Dept: LAB | Age: 25
End: 2025-02-27
Attending: STUDENT IN AN ORGANIZED HEALTH CARE EDUCATION/TRAINING PROGRAM
Payer: COMMERCIAL

## 2025-02-27 ENCOUNTER — OFFICE VISIT (OUTPATIENT)
Dept: FAMILY MEDICINE CLINIC | Facility: CLINIC | Age: 25
End: 2025-02-27
Payer: COMMERCIAL

## 2025-02-27 ENCOUNTER — PATIENT MESSAGE (OUTPATIENT)
Dept: FAMILY MEDICINE CLINIC | Facility: CLINIC | Age: 25
End: 2025-02-27

## 2025-02-27 VITALS
RESPIRATION RATE: 18 BRPM | WEIGHT: 185 LBS | TEMPERATURE: 98 F | DIASTOLIC BLOOD PRESSURE: 74 MMHG | BODY MASS INDEX: 25.9 KG/M2 | SYSTOLIC BLOOD PRESSURE: 112 MMHG | HEIGHT: 71 IN | HEART RATE: 72 BPM

## 2025-02-27 DIAGNOSIS — K21.9 GASTROESOPHAGEAL REFLUX DISEASE, UNSPECIFIED WHETHER ESOPHAGITIS PRESENT: ICD-10-CM

## 2025-02-27 DIAGNOSIS — K76.0 HEPATIC STEATOSIS: ICD-10-CM

## 2025-02-27 DIAGNOSIS — R74.8 ELEVATED LIPASE: ICD-10-CM

## 2025-02-27 DIAGNOSIS — R16.0 LIVER MASS, RIGHT LOBE: Primary | ICD-10-CM

## 2025-02-27 DIAGNOSIS — R16.0 LIVER MASS, RIGHT LOBE: ICD-10-CM

## 2025-02-27 LAB
ALBUMIN SERPL-MCNC: 5.6 G/DL (ref 3.2–4.8)
ALBUMIN/GLOB SERPL: 2.3 {RATIO} (ref 1–2)
ALP LIVER SERPL-CCNC: 84 U/L
ALT SERPL-CCNC: 78 U/L
ANION GAP SERPL CALC-SCNC: 8 MMOL/L (ref 0–18)
AST SERPL-CCNC: 38 U/L (ref ?–34)
BASOPHILS # BLD AUTO: 0.04 X10(3) UL (ref 0–0.2)
BASOPHILS NFR BLD AUTO: 0.7 %
BILIRUB SERPL-MCNC: 0.7 MG/DL (ref 0.3–1.2)
BUN BLD-MCNC: 11 MG/DL (ref 9–23)
CALCIUM BLD-MCNC: 9.9 MG/DL (ref 8.7–10.6)
CHLORIDE SERPL-SCNC: 100 MMOL/L (ref 98–112)
CHOLEST SERPL-MCNC: 201 MG/DL (ref ?–200)
CO2 SERPL-SCNC: 32 MMOL/L (ref 21–32)
CREAT BLD-MCNC: 0.99 MG/DL
EGFRCR SERPLBLD CKD-EPI 2021: 108 ML/MIN/1.73M2 (ref 60–?)
EOSINOPHIL # BLD AUTO: 0.17 X10(3) UL (ref 0–0.7)
EOSINOPHIL NFR BLD AUTO: 2.8 %
ERYTHROCYTE [DISTWIDTH] IN BLOOD BY AUTOMATED COUNT: 12.4 %
FASTING PATIENT LIPID ANSWER: NO
FASTING STATUS PATIENT QL REPORTED: NO
GGT SERPL-CCNC: 157 U/L
GLOBULIN PLAS-MCNC: 2.4 G/DL (ref 2–3.5)
GLUCOSE BLD-MCNC: 86 MG/DL (ref 70–99)
HAV AB SER QL IA: REACTIVE
HAV IGM SER QL: NONREACTIVE
HAV IGM SER QL: NONREACTIVE
HBV CORE AB SERPL QL IA: NONREACTIVE
HBV CORE IGM SER QL: NONREACTIVE
HBV SURFACE AB SER QL: NONREACTIVE
HBV SURFACE AB SERPL IA-ACNC: <3.1 MIU/ML
HBV SURFACE AG SER-ACNC: 0.13 [IU]/L
HBV SURFACE AG SERPL QL IA: NONREACTIVE
HBV SURFACE AG SERPL QL IA: NONREACTIVE
HCT VFR BLD AUTO: 46.4 %
HCV AB SERPL QL IA: NONREACTIVE
HDLC SERPL-MCNC: 45 MG/DL (ref 40–59)
HGB BLD-MCNC: 16.1 G/DL
IMM GRANULOCYTES # BLD AUTO: 0.01 X10(3) UL (ref 0–1)
IMM GRANULOCYTES NFR BLD: 0.2 %
LDLC SERPL CALC-MCNC: 109 MG/DL (ref ?–100)
LIPASE SERPL-CCNC: 70 U/L (ref 12–53)
LYMPHOCYTES # BLD AUTO: 1.86 X10(3) UL (ref 1–4)
LYMPHOCYTES NFR BLD AUTO: 30.8 %
MCH RBC QN AUTO: 30.3 PG (ref 26–34)
MCHC RBC AUTO-ENTMCNC: 34.7 G/DL (ref 31–37)
MCV RBC AUTO: 87.4 FL
MONOCYTES # BLD AUTO: 0.69 X10(3) UL (ref 0.1–1)
MONOCYTES NFR BLD AUTO: 11.4 %
NEUTROPHILS # BLD AUTO: 3.26 X10 (3) UL (ref 1.5–7.7)
NEUTROPHILS # BLD AUTO: 3.26 X10(3) UL (ref 1.5–7.7)
NEUTROPHILS NFR BLD AUTO: 54.1 %
NONHDLC SERPL-MCNC: 156 MG/DL (ref ?–130)
OSMOLALITY SERPL CALC.SUM OF ELEC: 289 MOSM/KG (ref 275–295)
PLATELET # BLD AUTO: 277 10(3)UL (ref 150–450)
POTASSIUM SERPL-SCNC: 3.6 MMOL/L (ref 3.5–5.1)
PROT SERPL-MCNC: 8 G/DL (ref 5.7–8.2)
RBC # BLD AUTO: 5.31 X10(6)UL
SODIUM SERPL-SCNC: 140 MMOL/L (ref 136–145)
TRIGL SERPL-MCNC: 275 MG/DL (ref 30–149)
TSI SER-ACNC: 1.64 UIU/ML (ref 0.55–4.78)
VLDLC SERPL CALC-MCNC: 47 MG/DL (ref 0–30)
WBC # BLD AUTO: 6 X10(3) UL (ref 4–11)

## 2025-02-27 PROCEDURE — 80061 LIPID PANEL: CPT | Performed by: STUDENT IN AN ORGANIZED HEALTH CARE EDUCATION/TRAINING PROGRAM

## 2025-02-27 PROCEDURE — 86709 HEPATITIS A IGM ANTIBODY: CPT | Performed by: STUDENT IN AN ORGANIZED HEALTH CARE EDUCATION/TRAINING PROGRAM

## 2025-02-27 PROCEDURE — 80074 ACUTE HEPATITIS PANEL: CPT | Performed by: STUDENT IN AN ORGANIZED HEALTH CARE EDUCATION/TRAINING PROGRAM

## 2025-02-27 PROCEDURE — 86704 HEP B CORE ANTIBODY TOTAL: CPT

## 2025-02-27 PROCEDURE — 99214 OFFICE O/P EST MOD 30 MIN: CPT | Performed by: STUDENT IN AN ORGANIZED HEALTH CARE EDUCATION/TRAINING PROGRAM

## 2025-02-27 PROCEDURE — 86708 HEPATITIS A ANTIBODY: CPT | Performed by: STUDENT IN AN ORGANIZED HEALTH CARE EDUCATION/TRAINING PROGRAM

## 2025-02-27 PROCEDURE — 80053 COMPREHEN METABOLIC PANEL: CPT | Performed by: STUDENT IN AN ORGANIZED HEALTH CARE EDUCATION/TRAINING PROGRAM

## 2025-02-27 PROCEDURE — 85025 COMPLETE CBC W/AUTO DIFF WBC: CPT | Performed by: STUDENT IN AN ORGANIZED HEALTH CARE EDUCATION/TRAINING PROGRAM

## 2025-02-27 PROCEDURE — 82977 ASSAY OF GGT: CPT | Performed by: STUDENT IN AN ORGANIZED HEALTH CARE EDUCATION/TRAINING PROGRAM

## 2025-02-27 PROCEDURE — 87340 HEPATITIS B SURFACE AG IA: CPT

## 2025-02-27 PROCEDURE — 84443 ASSAY THYROID STIM HORMONE: CPT | Performed by: STUDENT IN AN ORGANIZED HEALTH CARE EDUCATION/TRAINING PROGRAM

## 2025-02-27 PROCEDURE — 83690 ASSAY OF LIPASE: CPT | Performed by: STUDENT IN AN ORGANIZED HEALTH CARE EDUCATION/TRAINING PROGRAM

## 2025-02-27 PROCEDURE — 36415 COLL VENOUS BLD VENIPUNCTURE: CPT | Performed by: STUDENT IN AN ORGANIZED HEALTH CARE EDUCATION/TRAINING PROGRAM

## 2025-02-27 PROCEDURE — 86706 HEP B SURFACE ANTIBODY: CPT

## 2025-02-27 NOTE — PROGRESS NOTES
[facility] Family Medicine Note  02/27/25    Chief Complaint   Patient presents with    Test Results     HPI:   Jesús Troy is a 24 year old male who presents for ER follow up for epigastric pain.    Patient was seen in IC 2/17/25 and due to epigastric pain was sent to ER for further evaluation. Ultrasound revealed a 7cm liver mass and steatosis. ALT was slightly elevated at 54.    On Monday had vague email from work and they had heard about layoffs, patient works with [employer]. Did not get laid off. Had bad flare of acid reflux from the stress. Started omeprazole on Wednesday, then felt symptoms were worsening, certain positions made it worse. The following Monday felt even worse so was sent to IC then went to ER.     Has been stressed. Acid reflux went away. Completed 14 day course of omeprazole.    Wt Readings from Last 6 Encounters:   02/27/25 185 lb (83.9 kg)   01/03/25 180 lb (81.6 kg)   11/20/24 184 lb 12.8 oz (83.8 kg)   08/25/24 175 lb (79.4 kg)   07/01/24 186 lb 3.2 oz (84.5 kg)   05/14/24 184 lb (83.5 kg)     Past Medical History:    HTN (hypertension)     Past Surgical History:   Procedure Laterality Date    Injection, anesthetic/steroid, transforaminal epidural; lumbar/sacral, add'l level Left 10/27/2015    Procedure: LUMBAR / TRANSFORAMINAL EPIDURAL STEROID INJECTION;  Surgeon: Severino Ruvalcaba MD;  Location: [facility]    Injection, anesthetic/steroid, transforaminal epidural; lumbar/sacral, add'l level Left 11/19/2015    Procedure: LUMBAR / TRANSFORAMINAL EPIDURAL STEROID INJECTION;  Surgeon: Severino Ruvalcaba MD;  Location: [facility]    Injection, anesthetic/steroid, transforaminal epidural; lumbar/sacral, single level Left 10/27/2015    Procedure: LUMBAR / TRANSFORAMINAL EPIDURAL STEROID INJECTION;  Surgeon: Severino Ruvalcaba MD;  Location: [facility]    Injection, anesthetic/steroid, transforaminal epidural; lumbar/sacral, single level Left  11/19/2015    Procedure: LUMBAR / TRANSFORAMINAL EPIDURAL STEROID INJECTION;  Surgeon: Severino Ruvalcaba MD;  Location: [facility]     Allergies[1]   metoprolol succinate ER 50 MG Oral Tablet 24 Hr Take 1 tablet (50 mg total) by mouth daily. 90 tablet 0    amLODIPine 5 MG Oral Tab Take 1 tablet (5 mg total) by mouth daily. 90 tablet 1    ALPRAZolam 0.5 MG Oral Tab Take 1 tablet (0.5 mg total) by mouth as needed for Anxiety (flying). 30 tablet 0    levocetirizine 5 MG Oral Tab Take 1 tablet (5 mg total) by mouth every evening. 30 tablet 0     Social History     Socioeconomic History    Marital status: Single   Tobacco Use    Smoking status: Never     Passive exposure: Past    Smokeless tobacco: Never   Substance and Sexual Activity    Alcohol use: Yes     Comment: couple drinks on the weekends    Drug use: Yes     Types: Cannabis   Other Topics Concern    Caffeine Concern No    Exercise Yes     Comment: cardio, weight training    Seat Belt Yes     Counseling given: Not Answered    Family History   Problem Relation Age of Onset    Hypertension Father      Family Status   Relation Status    Fa (Not Specified)        REVIEW OF SYSTEMS:   See HPI    EXAM:   /74   Pulse 72   Temp 98.1 °F (36.7 °C) (Temporal)   Resp 18   Ht 5' 11\" (1.803 m)   Wt 185 lb (83.9 kg)   BMI 25.80 kg/m²  Estimated body mass index is 25.8 kg/m² as calculated from the following:    Height as of this encounter: 5' 11\" (1.803 m).    Weight as of this encounter: 185 lb (83.9 kg).   Vital signs reviewed. Appears stated age, well groomed.  Physical Exam:  GEN:  Patient is alert and oriented x3, no apparent distress  HEAD:  Normocephalic, atraumatic  HEENT:  no scleral icterus, conjunctivae clear bilaterally  LUNGS: clear to auscultation bilaterally, no rales/rhonchi/wheezing  HEART:  Regular rate and rhythm, normal S1/S2, no murmurs, rubs or gallops  ABDOMEN:  Bowel sounds normal, soft, nondistended, nontender, no  hepatosplenomegaly  EXTREMITIES:  Moves all extremities well  NEURO:  CN 2 - 12 grossly intact, gait normal      ASSESSMENT AND PLAN:   1. Liver mass, right lobe  Patient presents for ER follow-up for epigastric pain found to have right lobe liver mass measuring 7.1 cm.  Differential diagnosis includes focal nodular hyperplasia, hemangioma, cyst, malignancy.  Will evaluate further with MRI abdomen with and without contrast.  Will also check labs to assess for hepatitis and GGT to assess for liver damage.  Will refer to GI and hepatology as well.  Further recommendations pending results.  - Hepatitis Panel, Acute (4) [E]  - Hepatitis B Profile [E]; Future  - Hep A AB, Total [E]  - Hepatology Referral - External  - MRI ABDOMEN (W+WO) (CPT=74183); Future  - GGT (Gamma Glutamyl Transpeptidase) [E]  - Gastro Referral - In Network    2. Hepatic steatosis  Will check for immunity to hepatitis B and A  - Hepatitis Panel, Acute (4) [E]  - Hepatitis B Profile [E]; Future  - Hep A AB, Total [E]  - Hepatology Referral - External  - MRI ABDOMEN (W+WO) (CPT=74183); Future  - GGT (Gamma Glutamyl Transpeptidase) [E]  - Gastro Referral - In Network    3. Elevated lipase  Borderline, will repeat lipase  - Lipase [E]  - Gastro Referral - In Network    4. Gastroesophageal reflux disease, unspecified whether esophagitis present  Completed 14d course of omeprazole with improvement.  Will continue to monitor for now.  - Gastro Referral - In Network      Meds & Refills for this Visit:  Requested Prescriptions      No prescriptions requested or ordered in this encounter       Stop Taking                sucralfate 1 g Oral Tab    Take 1 tablet (1 g total) by mouth 4 (four) times daily before meals and nightly.            Health Maintenance:  Health Maintenance Due   Topic Date Due    COVID-19 Vaccine (1 - 2024-25 season) Never done    Annual Depression Screening  01/01/2025       Patient/Caregiver Education: There are no barriers to  learning. Medical education done.   Outcome: Patient verbalizes understanding. Patient is notified to call with any questions, complications, allergies, or worsening or changing symptoms.  Patient is to call with any side effects or complications from the treatments as a result of today.     Problem List:  Patient Active Problem List   Diagnosis    Avulsion fracture    Right ischial fracture (HCC)    Male pelvic pain    Chest pain    HTN (hypertension)    Fear of flying       Return for pending results.      Veronica La MD  [facility] Family Medicine  02/27/25      Please note that portions of this note may have been completed with a voice recognition program. Efforts were made to edit the dictations but occasionally words are mis-transcribed. Thank you for your understanding.    The 21st Century Cures Act makes medical notes like these available to patients in the interest of transparency. Please be advised this is a medical document. Medical documents are intended to carry relevant information, facts as evident, and the clinical opinion of the practitioner. The medical note is intended as peer to peer communication and may appear blunt or direct. It is written in medical language and may contain abbreviations or verbiage that are unfamiliar. If there are any questions or concerns please contact the provider for clarification.              [1]   Allergies  Allergen Reactions    Seasonal

## 2025-03-13 ENCOUNTER — LAB ENCOUNTER (OUTPATIENT)
Dept: LAB | Facility: HOSPITAL | Age: 25
End: 2025-03-13
Attending: INTERNAL MEDICINE
Payer: COMMERCIAL

## 2025-03-13 DIAGNOSIS — R74.8 ELEVATED LIVER ENZYMES: Primary | ICD-10-CM

## 2025-03-13 DIAGNOSIS — K70.9 ALCOHOLIC LIVER DISEASE (HCC): ICD-10-CM

## 2025-03-13 LAB
ALBUMIN SERPL-MCNC: 5.5 G/DL (ref 3.2–4.8)
ALP LIVER SERPL-CCNC: 80 U/L
ALT SERPL-CCNC: 78 U/L
AST SERPL-CCNC: 34 U/L (ref ?–34)
BILIRUB DIRECT SERPL-MCNC: 0.2 MG/DL (ref ?–0.3)
BILIRUB SERPL-MCNC: 0.7 MG/DL (ref 0.3–1.2)
PROT SERPL-MCNC: 7.4 G/DL (ref 5.7–8.2)

## 2025-03-13 PROCEDURE — 80076 HEPATIC FUNCTION PANEL: CPT

## 2025-03-13 PROCEDURE — 36415 COLL VENOUS BLD VENIPUNCTURE: CPT

## 2025-03-18 ENCOUNTER — MED REC SCAN ONLY (OUTPATIENT)
Dept: FAMILY MEDICINE CLINIC | Facility: CLINIC | Age: 25
End: 2025-03-18

## 2025-03-18 ENCOUNTER — OFFICE VISIT (OUTPATIENT)
Dept: GASTROENTEROLOGY | Facility: CLINIC | Age: 25
End: 2025-03-18

## 2025-03-18 ENCOUNTER — TELEPHONE (OUTPATIENT)
Facility: CLINIC | Age: 25
End: 2025-03-18

## 2025-03-18 VITALS
HEART RATE: 62 BPM | BODY MASS INDEX: 25.2 KG/M2 | DIASTOLIC BLOOD PRESSURE: 74 MMHG | SYSTOLIC BLOOD PRESSURE: 125 MMHG | HEIGHT: 71 IN | WEIGHT: 180 LBS

## 2025-03-18 DIAGNOSIS — Z83.79 FAMILY HISTORY OF CELIAC DISEASE: ICD-10-CM

## 2025-03-18 DIAGNOSIS — R16.0 LIVER MASS: ICD-10-CM

## 2025-03-18 DIAGNOSIS — R10.13 EPIGASTRIC PAIN: ICD-10-CM

## 2025-03-18 DIAGNOSIS — K21.9 GASTROESOPHAGEAL REFLUX DISEASE, UNSPECIFIED WHETHER ESOPHAGITIS PRESENT: ICD-10-CM

## 2025-03-18 DIAGNOSIS — K21.9 GASTROESOPHAGEAL REFLUX DISEASE WITHOUT ESOPHAGITIS: Primary | ICD-10-CM

## 2025-03-18 DIAGNOSIS — Z83.79 FHX: CELIAC DISEASE: ICD-10-CM

## 2025-03-18 DIAGNOSIS — R10.13 EPIGASTRIC PAIN: Primary | ICD-10-CM

## 2025-03-18 DIAGNOSIS — K76.0 FATTY LIVER: ICD-10-CM

## 2025-03-18 DIAGNOSIS — R79.89 ELEVATED LFTS: ICD-10-CM

## 2025-03-18 PROCEDURE — 99204 OFFICE O/P NEW MOD 45 MIN: CPT | Performed by: INTERNAL MEDICINE

## 2025-03-18 NOTE — PATIENT INSTRUCTIONS
PLAN    - Keep the appointment for the MRI    - Keep follow up with hepatology    - Have the celiac testing and H. Pylori breath test done when able      Instructions for the procedure  1. Schedule upper endoscopy (EGD) with MAC [Diagnosis: epigastric pain, refractory GERD]    2. If you start any NEW medication after your visit today, please notify us. Certain medications will need to be held before the procedure, or the procedure cannot be performed.           Things to consider to prevent acid reflux:  Certain foods have been recommended to be removed from the diet as they may aggravate GERD:  - Fatty foods (whole fat dairy, creamy/cheesy sauces, fried/stir fried foods, etc)  - Chocolate  - Mint  - Citrus fruits/citrus fruit juices (lemon, orange, tangerine, pineapple, grapefruit)  - Spicy foods (foods made with garlic, onion, peppers, etc)  - Tomatoes and tomato products (marinara sauce, pizza, salsa, tomato juice, etc)  - Caffeinated/carbonated beverages (coffee, tea, sodas, etc)  - Alcohol  - Ultra processed foods    There is not enough scientific evidence to support that avoiding these foods may reduce GERD.     You can avoid or limit these foods to see if it helps, but there is no need to do this if they do not bother you.    Overall, avoid or limit any food that bothers you. If you can't tell, you can keep a food and symptom diary for a week to identify foods that could be a problem for you.    Lifestyle Habits to Adopt That May Help Reduce Reflux  - Sit up while eating and for one or two hours afterward  - Eat smaller meals  - Avoid eating within 3 hours before bedtime  - Elevate the head of the bed while you are sleeping. You can place a phone book or a foam wedge underneath the mattress to do this.  - Lose weight if you are overweight or obese  - Exercise   - Reduce stress   - Avoid NSAID medications

## 2025-03-18 NOTE — TELEPHONE ENCOUNTER
Scheduled for: EGD 71446   Provider Name: Dr. Foster    Date: Wed 4/30/2025   Location: [facility]    Sedation: MAC   Time: 2:15 pm, (pt is aware that Endo will call the day before to confirm arrival time)  Prep: Nothing after midnight the day before procedure and NPO 3 hrs prior procedure  Meds/Allergies Reconciled?: NKDA   Diagnosis with codes: epigastric pain R10.13, Gerd K21.9, fatty liver K76.0, Family Hx celiac disease Z83.79   Was patient informed to call insurance with codes (Y/N): Yes   Referral sent?: Yes, [employer]    [facility] or [facility] notified?: I sent an electronic request to Endo Scheduling and received a confirmation today.     Medication Orders: Patient is aware to NOT take iron pills, herbal meds and diet supplements for 7 days before exam. Also to NOT take any form of alcohol, recreational drugs and any forms of ED meds 24-72 hours before exam.      Misc Orders:       Further instructions given by staff: Instructions given in office and pt verbalized understanding             Instructions for the procedure  1. Schedule upper endoscopy (EGD) with MAC [Diagnosis: epigastric pain, refractory GERD]     2. If you start any NEW medication after your visit today, please notify us. Certain medications will need to be held before the procedure, or the procedure cannot be performed.

## 2025-03-28 ENCOUNTER — HOSPITAL ENCOUNTER (OUTPATIENT)
Dept: MRI IMAGING | Facility: HOSPITAL | Age: 25
Discharge: HOME | End: 2025-03-28
Attending: STUDENT IN AN ORGANIZED HEALTH CARE EDUCATION/TRAINING PROGRAM
Payer: COMMERCIAL

## 2025-03-28 DIAGNOSIS — R16.0 LIVER MASS, RIGHT LOBE: ICD-10-CM

## 2025-03-28 DIAGNOSIS — K76.0 HEPATIC STEATOSIS: ICD-10-CM

## 2025-03-28 PROCEDURE — 74183 MRI ABD W/O CNTR FLWD CNTR: CPT | Performed by: STUDENT IN AN ORGANIZED HEALTH CARE EDUCATION/TRAINING PROGRAM

## 2025-03-28 PROCEDURE — A9581 GADOXETATE DISODIUM INJ: HCPCS | Performed by: STUDENT IN AN ORGANIZED HEALTH CARE EDUCATION/TRAINING PROGRAM

## 2025-04-26 DIAGNOSIS — I10 HYPERTENSION, UNSPECIFIED TYPE: ICD-10-CM

## 2025-04-29 RX ORDER — METOPROLOL SUCCINATE 50 MG/1
50 TABLET, EXTENDED RELEASE ORAL DAILY
Qty: 90 TABLET | Refills: 3 | Status: SHIPPED | OUTPATIENT
Start: 2025-04-29

## 2025-04-29 NOTE — TELEPHONE ENCOUNTER
Last Office Visit: 2/27/25  Next Office Visit: na  Last Refill:12/13/24  Medication Quantity Refills Start End   metoprolol succinate ER 50 MG Oral Tablet 24 Hr 90 tablet 0 12/13/2024 --   Sig:   Take 1 tablet (50 mg total) by mouth daily.     Route:   Oral         Please authorize if acceptable.   Thank you!

## 2025-04-30 ENCOUNTER — TELEPHONE (OUTPATIENT)
Dept: GASTROENTEROLOGY | Facility: CLINIC | Age: 25
End: 2025-04-30

## 2025-04-30 ENCOUNTER — HOSPITAL ENCOUNTER (OUTPATIENT)
Facility: HOSPITAL | Age: 25
Setting detail: HOSPITAL OUTPATIENT SURGERY
Discharge: HOME | End: 2025-04-30
Attending: INTERNAL MEDICINE | Admitting: INTERNAL MEDICINE
Payer: COMMERCIAL

## 2025-04-30 ENCOUNTER — ANESTHESIA (OUTPATIENT)
Dept: ENDOSCOPY | Facility: HOSPITAL | Age: 25
End: 2025-04-30
Payer: COMMERCIAL

## 2025-04-30 ENCOUNTER — ANESTHESIA EVENT (OUTPATIENT)
Dept: ENDOSCOPY | Facility: HOSPITAL | Age: 25
End: 2025-04-30
Payer: COMMERCIAL

## 2025-04-30 VITALS
BODY MASS INDEX: 25.2 KG/M2 | OXYGEN SATURATION: 97 % | SYSTOLIC BLOOD PRESSURE: 131 MMHG | DIASTOLIC BLOOD PRESSURE: 93 MMHG | WEIGHT: 180 LBS | HEIGHT: 71 IN | RESPIRATION RATE: 14 BRPM | HEART RATE: 75 BPM

## 2025-04-30 DIAGNOSIS — K76.0 FATTY LIVER: ICD-10-CM

## 2025-04-30 DIAGNOSIS — Z83.79 FHX: CELIAC DISEASE: ICD-10-CM

## 2025-04-30 DIAGNOSIS — R10.13 EPIGASTRIC PAIN: ICD-10-CM

## 2025-04-30 DIAGNOSIS — K21.9 GASTROESOPHAGEAL REFLUX DISEASE WITHOUT ESOPHAGITIS: ICD-10-CM

## 2025-04-30 PROBLEM — K22.2 SCHATZKI'S RING: Status: ACTIVE | Noted: 2025-04-30

## 2025-04-30 PROBLEM — K21.00 GASTROESOPHAGEAL REFLUX DISEASE WITH ESOPHAGITIS WITHOUT HEMORRHAGE: Status: ACTIVE | Noted: 2025-04-30

## 2025-04-30 PROCEDURE — 43239 EGD BIOPSY SINGLE/MULTIPLE: CPT | Performed by: INTERNAL MEDICINE

## 2025-04-30 RX ORDER — SODIUM CHLORIDE, SODIUM LACTATE, POTASSIUM CHLORIDE, CALCIUM CHLORIDE 600; 310; 30; 20 MG/100ML; MG/100ML; MG/100ML; MG/100ML
INJECTION, SOLUTION INTRAVENOUS CONTINUOUS
Status: DISCONTINUED | OUTPATIENT
Start: 2025-04-30 | End: 2025-04-30

## 2025-04-30 RX ORDER — NALOXONE HYDROCHLORIDE 0.4 MG/ML
0.08 INJECTION, SOLUTION INTRAMUSCULAR; INTRAVENOUS; SUBCUTANEOUS ONCE AS NEEDED
Status: DISCONTINUED | OUTPATIENT
Start: 2025-04-30 | End: 2025-04-30

## 2025-04-30 RX ORDER — PANTOPRAZOLE SODIUM 40 MG/1
40 TABLET, DELAYED RELEASE ORAL
Qty: 90 TABLET | Refills: 3 | Status: SHIPPED | OUTPATIENT
Start: 2025-04-30

## 2025-04-30 RX ADMIN — SODIUM CHLORIDE, SODIUM LACTATE, POTASSIUM CHLORIDE, CALCIUM CHLORIDE: 600; 310; 30; 20 INJECTION, SOLUTION INTRAVENOUS at 10:51:00

## 2025-04-30 NOTE — TELEPHONE ENCOUNTER
Spoke with patient and made follow up appointment for 05/27/25 at 11:30am at [facility] with Dr. Foster.

## 2025-04-30 NOTE — OPERATIVE REPORT
ESOPHAGOGASTRODUODENOSCOPY (EGD) REPORT    Jesús Troy     2000 Age 24 year old   PCP Veronica La MD Endoscopist Michelle Foster MD     Date of procedure: 25    Procedure: EGD w/ cold biopsy    Pre-operative diagnosis: epigastric pain, GERD, family history of celiac disease     Post-operative diagnosis: gastritis, LA Class B/C esophagitis, widely patent Schatzki's ring, small hiatal hernia     Medications: MAC    Complications: none    Procedure:  Informed consent was obtained from the patient after the risks of the procedure were discussed, including but not limited to bleeding, perforation, aspiration, infection, or possibility of a missed lesion. After discussions of the risks/benefits and alternatives to this procedure, as well as the planned sedation, the patient was placed in the left lateral decubitus position and begun on continuous blood pressure pulse oximetry and EKG monitoring and this was maintained throughout the procedure. Once an adequate level of sedation was obtained a bite block was placed. Then the lubricated tip of the [id number] diagnostic video upper endoscope was inserted and advanced using direct visualization into the posterior pharynx and ultimately into the esophagus.    Complications: None    Findings:      1. Esophagus: The squamocolumnar junction was noted at 35 cm and appeared irregular due to LA Class B/C esophagitis with a small area of nodularity - the esophagitis and nodularity was biopsied. Biopsies were also taken in four quadrants to disrupt a wide patent Schatzki's ring noted at GE junction. The diaphragmatic pinch was noted noted at 37 cm from the incisors. There was a 2 cm hiatal hernia. The esophageal mucosa appeared otherwise unremarkable and cold forceps biopsies were taken of the distal and proximal esophagus.     2. Stomach: The stomach distended normally. Normal rugal folds were seen. The pylorus was patent. The gastric mucosa appeared  mildly erythematous diffusely and cold forceps biopsies were taken of the antrum, incisura, and body. Retroflexion revealed a normal fundus and a non-patulous cardia.     3. Duodenum: The duodenal mucosa appeared normal in the 1st and 2nd portion of the duodenum. Six single pass biopsies were taken - 2 in the bulb and 4 in the descending duodenum.      Impression:  1. LA Class B/C esophagitis with a small focus of nodularity and a widely patent Schatzki's ring at the GE junction. Biopsied.   2. 2 cm hiatal hernia.   3. Mild gastritis. Biopsied.   4. Unremarkable duodenum. Biopsied to rule out celiac disease.     Recommend:  1. Await pathology.  2. Start pantoprazole 40 mg daily for acid reflux.  3. Repeat EGD is recommended in ~8 weeks to ensure healing of the esophagitis and nodularity.    4. Avoid NSAID medications.  5. Follow up with Dr. Foster in 3-4 weeks.       >>>If tissue was sampled/removed and you have not received your pathology results either by phone or letter within 2 weeks, please call our office at [phone].    Specimens: duodenum, stomach, GE junction, esophagus     Blood loss: <1 ml

## 2025-04-30 NOTE — DISCHARGE INSTRUCTIONS
Home Care Instructions for Gastroscopy with Sedation    Diet:  - Resume your regular diet as tolerated unless otherwise instructed.  - Start with light meals to minimize bloating.  - Do not drink alcohol today.    Medication:  - If you have questions about resuming your normal medications, please contact your Primary Care Physician.    Activities:  - Take it easy today. Do not return to work today.  - Do not drive today.  - Do not operate any machinery today (including kitchen equipment).  - Do not make any critical decisions or sign any paperwork.  - Do not exercise today.    Gastroscopy:  - You may have a sore throat for 2-3 days following the exam. This is normal. Gargling with warm salt water (1/2 tsp salt to 1 glass warm water) or using throat lozenges will help.  - If you experience any sharp pain in your neck, abdomen or chest, vomiting of blood, oral temperature over 100 degrees Fahrenheit, light-headedness or dizziness, or any other problems, contact your doctor.    **If unable to reach your doctor, please go to the [facility] Emergency Room**    - Your referring physician will receive a full report of your examination.  - If you do not hear from your doctor's office within two weeks of your biopsy, please call them for your results.    You may be able to see your laboratory results in [employer] between 4 and 7 business days.  In some cases, your physician may not have viewed the results before they are released to [employer].  If you have questions regarding your results contact the physician who ordered the test/exam by phone or via [employer] by choosing \"Ask a Medical Question.\"

## 2025-04-30 NOTE — ANESTHESIA POSTPROCEDURE EVALUATION
Patient: Jesús Troy    Procedure Summary       Date: 04/30/25 Room / Location: [facility] ENDOSCOPY 04 / [facility] ENDOSCOPY    Anesthesia Start: 1051 Anesthesia Stop:     Procedure: ESOPHAGOGASTRODUODENOSCOPY Diagnosis:       Gastroesophageal reflux disease without esophagitis      Epigastric pain      FHx: celiac disease      Fatty liver      (gastritis; hiatal hernia; esophagitis)    Surgeons: Michelle Foster MD Anesthesiologist: Tomer Cerda CRNA    Anesthesia Type: MAC ASA Status: 2            Anesthesia Type: No value filed.    Vitals Value Taken Time   /64 04/30/25 11:17   Temp - 04/30/25 11:24   Pulse 101 04/30/25 11:24   Resp 19 04/30/25 11:24   SpO2 98 % 04/30/25 11:24   Vitals shown include unfiled device data.    [facility] AN Post Evaluation:   Patient Evaluated in PACU  Patient Participation: complete - patient participated  Level of Consciousness: awake and alert and awake  Pain Score: 0  Pain Management: adequate  Airway Patency:patent  Dental exam unchanged from preop  Yes    Nausea/Vomiting: none  Cardiovascular Status: acceptable and blood pressure returned to baseline  Respiratory Status: room air  Postoperative Hydration acceptable      Tomer Cerda CRNA  4/30/2025 11:24 AM

## 2025-04-30 NOTE — ANESTHESIA POSTPROCEDURE EVALUATION
Patient: Jesús Troy    Procedure Summary       Date: 04/30/25 Room / Location: [facility] ENDOSCOPY 04 / [facility] ENDOSCOPY    Anesthesia Start: 1051 Anesthesia Stop: 1118    Procedure: ESOPHAGOGASTRODUODENOSCOPY Diagnosis:       Gastroesophageal reflux disease without esophagitis      Epigastric pain      FHx: celiac disease      Fatty liver      (gastritis; hiatal hernia; esophagitis)    Surgeons: Michelle Foster MD Anesthesiologist: Tomer Cerda CRNA    Anesthesia Type: MAC ASA Status: 2            Anesthesia Type: MAC    Vitals Value Taken Time   /64 04/30/25 11:17   Temp - 04/30/25 11:25   Pulse 101 04/30/25 11:24   Resp 19 04/30/25 11:24   SpO2 98 % 04/30/25 11:24   Vitals shown include unfiled device data.    [facility] AN Post Evaluation:   Patient Participation: complete - patient participated  Level of Consciousness: awake and alert and awake  Pain Score: 0  Pain Management: adequate  Airway Patency:patent  Dental exam unchanged from preop  Yes    Nausea/Vomiting: none  Cardiovascular Status: acceptable and blood pressure returned to baseline  Respiratory Status: room air  Postoperative Hydration acceptable      Tomer Cerda CRNA  4/30/2025 11:25 AM

## 2025-04-30 NOTE — H&P
History & Physical Examination    Patient Name: Jesús Troy  MRN: [id number]  CSN: [id number]  YOB: 2000    Diagnosis: epigastric pain, GERD, family history of celiac disease    Prescriptions Prior to Admission[1]  Current Hospital Medications[2]    Allergies: Allergies[3]    Past Medical History[4]  Past Surgical History[5]  Family History[6]  Social History     Tobacco Use    Smoking status: Never     Passive exposure: Past    Smokeless tobacco: Never   Substance Use Topics    Alcohol use: Not Currently     Comment: couple drinks on the weekends         SYSTEM Check if Review is Normal Check if Physical Exam is Normal If not normal, please explain:   HEENT [X ] [ X]    NECK  [X ] [ X]    HEART [X ] [ X]    LUNGS [X ] [ X]    ABDOMEN [X ] [ X]    EXTREMITIES [X ] [ X]    OTHER        I have discussed the risks and benefits and alternatives of the procedure with the patient/family.  They understand and agree to proceed with plan of care.   I have reviewed the History and Physical done within the last 30 days.  Any changes noted above.    Michelle Foster MD  [facility] - Gastroenterology  4/30/2025             [1]   Medications Prior to Admission   Medication Sig Dispense Refill Last Dose/Taking    METOPROLOL SUCCINATE ER 50 MG Oral Tablet 24 Hr TAKE 1 TABLET DAILY 90 tablet 3 4/29/2025    amLODIPine 5 MG Oral Tab Take 1 tablet (5 mg total) by mouth daily. 90 tablet 1 4/29/2025    ALPRAZolam 0.5 MG Oral Tab Take 1 tablet (0.5 mg total) by mouth as needed for Anxiety (flying). 30 tablet 0 4/23/2025    fluticasone propionate 50 MCG/ACT Nasal Suspension 2 sprays by Each Nare route daily. 1 each 0 Taking    levocetirizine 5 MG Oral Tab Take 1 tablet (5 mg total) by mouth every evening. 30 tablet 0 4/28/2025   [2]   Current Facility-Administered Medications   Medication Dose Route Frequency    lactated ringers infusion   Intravenous Continuous   [3]   Allergies  Allergen Reactions    Seasonal    [4]    Past Medical History:   Anesthesia complication    Disorder of liver    fatty liver    High blood pressure    HTN (hypertension)    PONV (postoperative nausea and vomiting)   [5]   Past Surgical History:  Procedure Laterality Date    Injection, anesthetic/steroid, transforaminal epidural; lumbar/sacral, add'l level Left 10/27/2015    Procedure: LUMBAR / TRANSFORAMINAL EPIDURAL STEROID INJECTION;  Surgeon: Severino Ruvalcaba MD;  Location: [facility]    Injection, anesthetic/steroid, transforaminal epidural; lumbar/sacral, add'l level Left 11/19/2015    Procedure: LUMBAR / TRANSFORAMINAL EPIDURAL STEROID INJECTION;  Surgeon: Severino Ruvalcaba MD;  Location: [facility]    Injection, anesthetic/steroid, transforaminal epidural; lumbar/sacral, single level Left 10/27/2015    Procedure: LUMBAR / TRANSFORAMINAL EPIDURAL STEROID INJECTION;  Surgeon: Severino Ruvalcaba MD;  Location: [facility]    Injection, anesthetic/steroid, transforaminal epidural; lumbar/sacral, single level Left 11/19/2015    Procedure: LUMBAR / TRANSFORAMINAL EPIDURAL STEROID INJECTION;  Surgeon: Severino Ruvalcaba MD;  Location: [facility]    Other surgical history Left 05/2017    hip hamstring repair   [6]   Family History  Problem Relation Age of Onset    Hypertension Father     Cancer Mother 39        cervical cancer

## 2025-04-30 NOTE — TELEPHONE ENCOUNTER
GI staff - please call pt to help set up follow up in ~3-4 weeks. Ok to use a res-24 slot. Thanks!

## 2025-04-30 NOTE — ANESTHESIA PREPROCEDURE EVALUATION
Anesthesia PreOp Note    HPI:     Jesús Troy is a 24 year old male who presents for preoperative consultation requested by: Michelle Foster MD    Date of Surgery: 4/30/2025    Procedure(s):  ESOPHAGOGASTRODUODENOSCOPY  Indication: Gastroesophageal reflux disease without esophagitis/ Epigastric pain/ FHx: celiac disease / Fatty liver    Relevant Problems   No relevant active problems       NPO:  Last Liquid Consumption Date: 04/30/25  Last Liquid Consumption Time: 0730  Last Solid Consumption Date: 04/29/25  Last Solid Consumption Time: 2200  Last Liquid Consumption Date: 04/30/25          History Review:  Patient Active Problem List    Diagnosis Date Noted    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  Lab Results   Component Value Date    WBC 6.0 02/27/2025    RBC 5.31 02/27/2025    HGB 16.1 02/27/2025    HCT 46.4 02/27/2025    MCV 87.4 02/27/2025    MCH 30.3 02/27/2025    MCHC 34.7 02/27/2025    RDW 12.4 02/27/2025    .0 02/27/2025     Lab Results   Component Value Date     02/27/2025    K 3.6 02/27/2025     02/27/2025    CO2 32.0 02/27/2025    BUN 11 02/27/2025    CREATSERUM 0.99 02/27/2025    GLU 86 02/27/2025    CA 9.9 02/27/2025          Vital Signs:  Body mass index is 25.1 kg/m².   height is 1.803 m (5' 11\") and weight is 81.6 kg (180 lb). His blood pressure is 142/76 and his pulse is 81. His respiration is 22 and oxygen saturation is 99%.   Vitals:    04/23/25 1224 04/30/25 1042   BP:  142/76   Pulse:  81   Resp:  22   SpO2:  99%   Weight: 81.6 kg (180 lb)    Height: 1.803 m (5' 11\")          Anesthesia Evaluation     Patient summary reviewed    History of anesthetic complications   Airway   Mallampati: II  Dental - Dentition appears grossly intact     Pulmonary - normal exam   Cardiovascular - normal exam  (+) hypertension    Neuro/Psych - negative ROS     GI/Hepatic/Renal    (+) liver disease    Endo/Other - negative ROS   Abdominal  - normal exam                 Anesthesia Plan:   ASA:  2  Plan:   MAC  Informed Consent Plan and Risks Discussed With:  Patient  Discussed plan with:  CRNA      I have informed Jesús Troy and/or legal guardian or family member of the nature of the anesthetic plan, benefits, risks including possible dental damage if relevant, major complications, and any alternative forms of anesthetic management.   All of the patient's questions were answered to the best of my ability. The patient desires the anesthetic management as planned.  Tomer Cerda CRNA  4/30/2025 10:50 AM  Present on Admission:  **None**           [1]   Past Medical History:   Anesthesia complication    Disorder of liver    fatty liver    High blood pressure    HTN (hypertension)    PONV (postoperative nausea and vomiting)   [2]   Past Surgical History:  Procedure Laterality Date    Injection, anesthetic/steroid, transforaminal epidural; lumbar/sacral, add'l level Left 10/27/2015    Procedure: LUMBAR / TRANSFORAMINAL EPIDURAL STEROID INJECTION;  Surgeon: Severino Ruvalcaba MD;  Location: [facility]    Injection, anesthetic/steroid, transforaminal epidural; lumbar/sacral, add'l level Left 11/19/2015    Procedure: LUMBAR / TRANSFORAMINAL EPIDURAL STEROID INJECTION;  Surgeon: Severino Ruvalcaba MD;  Location: [facility]    Injection, anesthetic/steroid, transforaminal epidural; lumbar/sacral, single level Left 10/27/2015    Procedure: LUMBAR / TRANSFORAMINAL EPIDURAL STEROID INJECTION;  Surgeon: Severino Ruvalcaba MD;  Location: [facility]    Injection, anesthetic/steroid, transforaminal  epidural; lumbar/sacral, single level Left 11/19/2015    Procedure: LUMBAR / TRANSFORAMINAL EPIDURAL STEROID INJECTION;  Surgeon: Severino Ruvalcaba MD;  Location: [facility]    Other surgical history Left 05/2017    hip hamstring repair   [3]   Medications Prior to Admission   Medication Sig Dispense Refill Last Dose/Taking    METOPROLOL SUCCINATE ER 50 MG Oral Tablet 24 Hr TAKE 1 TABLET DAILY 90 tablet 3 4/29/2025    amLODIPine 5 MG Oral Tab Take 1 tablet (5 mg total) by mouth daily. 90 tablet 1 4/29/2025    ALPRAZolam 0.5 MG Oral Tab Take 1 tablet (0.5 mg total) by mouth as needed for Anxiety (flying). 30 tablet 0 4/23/2025    fluticasone propionate 50 MCG/ACT Nasal Suspension 2 sprays by Each Nare route daily. 1 each 0 Taking    levocetirizine 5 MG Oral Tab Take 1 tablet (5 mg total) by mouth every evening. 30 tablet 0 4/28/2025   [4]   Current Facility-Administered Medications Ordered in Epic   Medication Dose Route Frequency Provider Last Rate Last Admin    lactated ringers infusion   Intravenous Continuous Michelle Foster MD 20 mL/hr at 04/30/25 1042 New Bag at 04/30/25 1042     No current [facility]-ordered outpatient medications on file.   [5]   Allergies  Allergen Reactions    Seasonal    [6]   Family History  Problem Relation Age of Onset    Hypertension Father     Cancer Mother 39        cervical cancer   [7]   Social History  Socioeconomic History    Marital status: Single   Tobacco Use    Smoking status: Never     Passive exposure: Past    Smokeless tobacco: Never   Substance and Sexual Activity    Alcohol use: Not Currently     Comment: couple drinks on the weekends    Drug use: Yes     Types: Cannabis   Other Topics Concern    Caffeine Concern No    Exercise Yes     Comment: cardio, weight training    Seat Belt Yes

## 2025-05-07 ENCOUNTER — PATIENT MESSAGE (OUTPATIENT)
Dept: FAMILY MEDICINE CLINIC | Facility: CLINIC | Age: 25
End: 2025-05-07

## 2025-05-15 ENCOUNTER — LAB ENCOUNTER (OUTPATIENT)
Dept: LAB | Facility: HOSPITAL | Age: 25
End: 2025-05-15
Attending: INTERNAL MEDICINE
Payer: COMMERCIAL

## 2025-05-15 DIAGNOSIS — K70.9 ALCOHOLIC LIVER DISEASE (HCC): ICD-10-CM

## 2025-05-15 DIAGNOSIS — R74.8 ELEVATED LIVER ENZYMES: ICD-10-CM

## 2025-05-15 LAB
ALBUMIN SERPL-MCNC: 5.3 G/DL (ref 3.2–4.8)
ALP LIVER SERPL-CCNC: 80 U/L (ref 45–117)
ALT SERPL-CCNC: 75 U/L (ref 10–49)
AST SERPL-CCNC: 36 U/L (ref ?–34)
BILIRUB DIRECT SERPL-MCNC: 0.2 MG/DL (ref ?–0.3)
BILIRUB SERPL-MCNC: 0.6 MG/DL (ref 0.3–1.2)
PROT SERPL-MCNC: 7.3 G/DL (ref 5.7–8.2)

## 2025-05-15 PROCEDURE — 80076 HEPATIC FUNCTION PANEL: CPT

## 2025-05-15 PROCEDURE — 36415 COLL VENOUS BLD VENIPUNCTURE: CPT

## 2025-05-16 ENCOUNTER — TELEPHONE (OUTPATIENT)
Facility: CLINIC | Age: 25
End: 2025-05-16

## 2025-05-16 NOTE — TELEPHONE ENCOUNTER
1st,overdue reminder letter mailed out to patient   Labs order   Orders Placed on 3/18/25    Celiac Disease Screen Reflex  Helicobacter Pylori Breath Test, Adult

## 2025-06-23 ENCOUNTER — TELEPHONE (OUTPATIENT)
Dept: FAMILY MEDICINE CLINIC | Facility: CLINIC | Age: 25
End: 2025-06-23

## 2025-06-23 DIAGNOSIS — I10 HYPERTENSION, UNSPECIFIED TYPE: ICD-10-CM

## 2025-06-23 RX ORDER — AMLODIPINE BESYLATE 5 MG/1
5 TABLET ORAL DAILY
Qty: 90 TABLET | Refills: 0 | Status: SHIPPED | OUTPATIENT
Start: 2025-06-23

## 2025-06-23 NOTE — TELEPHONE ENCOUNTER
LOV:  2/27/25    NOV:  8/14/25 Med check    Last refill:  11/27/24  #90, 1 refill     Medication Detail  Medication Quantity Refills Start End   amLODIPine 5 MG Oral Tab 90 tablet 1 11/27/2024 --   Sig:   Take 1 tablet (5 mg total) by mouth daily.     Route:   Oral

## 2025-06-23 NOTE — TELEPHONE ENCOUNTER
Refill Request      Who is calling:   Patient    Medication:  amLODIPine 5 MG Oral Tab     Pharmacy:  [facility] - [address] [phone], [phone]     Pills left:  7-10 days    Last office visit:  2/27/25    Next office visit:  8/15/25

## 2025-07-03 ENCOUNTER — OFFICE VISIT (OUTPATIENT)
Dept: GASTROENTEROLOGY | Facility: CLINIC | Age: 25
End: 2025-07-03
Payer: COMMERCIAL

## 2025-07-03 ENCOUNTER — TELEPHONE (OUTPATIENT)
Facility: CLINIC | Age: 25
End: 2025-07-03

## 2025-07-03 VITALS
HEIGHT: 71 IN | HEART RATE: 69 BPM | DIASTOLIC BLOOD PRESSURE: 86 MMHG | WEIGHT: 184 LBS | BODY MASS INDEX: 25.76 KG/M2 | SYSTOLIC BLOOD PRESSURE: 123 MMHG

## 2025-07-03 DIAGNOSIS — K21.00 GASTROESOPHAGEAL REFLUX DISEASE WITH ESOPHAGITIS, UNSPECIFIED WHETHER HEMORRHAGE: Primary | ICD-10-CM

## 2025-07-03 DIAGNOSIS — K22.2 SCHATZKI'S RING: ICD-10-CM

## 2025-07-03 DIAGNOSIS — K20.90 ESOPHAGITIS: Primary | ICD-10-CM

## 2025-07-03 PROCEDURE — 99214 OFFICE O/P EST MOD 30 MIN: CPT | Performed by: INTERNAL MEDICINE

## 2025-07-03 RX ORDER — OMEGA-3 FATTY ACIDS/FISH OIL 300-1000MG
CAPSULE ORAL AS NEEDED
COMMUNITY
End: 2025-07-03

## 2025-07-03 RX ORDER — PANTOPRAZOLE SODIUM 40 MG/1
40 TABLET, DELAYED RELEASE ORAL
Qty: 90 TABLET | Refills: 3 | Status: SHIPPED | OUTPATIENT
Start: 2025-07-03

## 2025-07-03 NOTE — PATIENT INSTRUCTIONS
PLAN    - Continue pantoprazole 40 mg daily    - Avoid NSAIDs    Instructions for the procedure  1. Schedule upper endoscopy (EGD) with MAC [Diagnosis: Severe esophagitis]    2. If you start any NEW medication after your visit today, please notify us. Certain medications will need to be held before the procedure, or the procedure cannot be performed.

## 2025-07-03 NOTE — TELEPHONE ENCOUNTER
Scheduled for:    EGD 94987  Provider Name:   DR GREGORY  Date:   8/11/2025  Location:   [facility]  Sedation:  MAC   Time:  1145 AM (pt is aware that [facility] will call the day before to confirm arrival time)  Prep:  NOTHING TO EAT AFTER MIDNIGHT   Meds/Allergies Reconciled?:   DR GREGORY   Diagnosis with codes:  SEVERE ESOPHAGHITIS  k20.9  Was patient informed to call insurance with codes (Y/N):  Yes, I confirmed the insurance with the patient.   Referral sent?:  N/A  EOSC :  I sent an electronic request to Endo Scheduling and received a confirmation today.      Medication Orders:  This patient verbally confirmed that he is not taking:   Iron, blood thinners, BP meds, and is not diabetic   Not latex allergy, Not PCN allergy and does not have a pacemaker     Misc Orders:       Further instructions given by staff:

## 2025-07-03 NOTE — PROGRESS NOTES
[facility] - Gastroenterology                                                                                                                 Chief Complaint   Patient presents with    Follow - Up     Had EGD 4/30/25       HPI:   Jesús Troy is a 24 year old year-old male with h/o HTN here for the following:    Pt's symptoms are well controlled with pantoprazole 40 mg daily.  He is trying to avoid GERD triggers and trying lifestyle modifications for weight reduction.  Pt denies dysphagia, f/c, abdominal pain, weight loss, blood in the stool, change in bowel  habits, or any other symptoms.      Father - celiac disease  Denies family h/o liver disease or colorectal cancer.       US gallbladder 2/17/25  Impression   CONCLUSION:  1. Normal gallbladder.  2. A 7.1 cm right hepatic mass.  Follow-up nonemergent MRI with and without intravenous gadolinium recommended for further evaluation.  3. Hepatic steatosis.           Prior endoscopies:  EGD 4/30/25  Impression:  1. LA Class B/C esophagitis with a small focus of nodularity and a widely patent Schatzki's ring at the GE junction. Biopsied.   2. 2 cm hiatal hernia.   3. Mild gastritis. Biopsied.   4. Unremarkable duodenum. Biopsied to rule out celiac disease.      Recommend:  1. Await pathology.  2. Start pantoprazole 40 mg daily for acid reflux.  3. Repeat EGD is recommended in ~8 weeks to ensure healing of the esophagitis and nodularity.    4. Avoid NSAID medications.  5. Follow up with Dr. Foster in 3-4 weeks.        Final Diagnosis:      A. Duodenum; biopsy:  Multiple fragments of small bowel mucosa demonstrating no significant pathologic changes.  Normal villous architecture present.  No evidence of celiac sprue, granuloma, dysplasia, or malignancy is identified.     B. Gastric biopsy:  Fragments of gastric mucosa with focal mild inactive chronic inflammation.  Diff  Quik stain (with appropriate control reactivity) is negative for H pylori microorganisms.     C. Esophagus, GE junction; biopsy:  Multiple fragments of squamous and glandular gastric type epithelium with mild reflux esophagitis.  No evidence of eosinophilic esophagitis, goblet cell intestinal metaplasia, dysplasia, or malignancy is identified.  Diff Quik stain (with appropriate control reactivity) is negative for H pylori microorganisms.     D. Esophagus; biopsy:   Fragments of hyperplastic squamous epithelium with mild reflux esophagitis.  No evidence of eosinophilic esophagitis, goblet cell intestinal metaplasia, dysplasia, or malignancy is identified.         Soc:  -denies smoking  -denies current Etoh - stopped after seeing hepatology   -no illicit drug use    Wt Readings from Last 6 Encounters:   07/03/25 184 lb (83.5 kg)   04/23/25 180 lb (81.6 kg)   03/18/25 180 lb (81.6 kg)   02/27/25 185 lb (83.9 kg)   01/03/25 180 lb (81.6 kg)   11/20/24 184 lb 12.8 oz (83.8 kg)        History, Medications, Allergies, ROS:      Past Medical History:    Anesthesia complication    Disorder of liver    fatty liver    High blood pressure    HTN (hypertension)    PONV (postoperative nausea and vomiting)      Past Surgical History:   Procedure Laterality Date    Egd  04/2025    Injection, anesthetic/steroid, transforaminal epidural; lumbar/sacral, add'l level Left 10/27/2015    Procedure: LUMBAR / TRANSFORAMINAL EPIDURAL STEROID INJECTION;  Surgeon: Severino Ruvalcaba MD;  Location: [facility]    Injection, anesthetic/steroid, transforaminal epidural; lumbar/sacral, add'l level Left 11/19/2015    Procedure: LUMBAR / TRANSFORAMINAL EPIDURAL STEROID INJECTION;  Surgeon: Severino Ruvalcaba MD;  Location: [facility]    Injection, anesthetic/steroid, transforaminal epidural; lumbar/sacral, single level Left 10/27/2015    Procedure: LUMBAR / TRANSFORAMINAL EPIDURAL STEROID INJECTION;  Surgeon: Severino Ruvalcaba MD;   Location: [facility]    Injection, anesthetic/steroid, transforaminal epidural; lumbar/sacral, single level Left 11/19/2015    Procedure: LUMBAR / TRANSFORAMINAL EPIDURAL STEROID INJECTION;  Surgeon: Severino Ruvalcaba MD;  Location: [facility]    Other surgical history Left 05/2017    hip hamstring repair      Family Hx:   Family History   Problem Relation Age of Onset    Hypertension Father     Cancer Mother 39        cervical cancer      Social History:   Social History     Socioeconomic History    Marital status: Single   Tobacco Use    Smoking status: Never     Passive exposure: Past    Smokeless tobacco: Never   Substance and Sexual Activity    Alcohol use: Not Currently     Comment: couple drinks on the weekends    Drug use: Yes     Types: Cannabis   Other Topics Concern    Caffeine Concern No    Exercise Yes     Comment: cardio, weight training    Seat Belt Yes        Medications (Active prior to today's visit):  Current Outpatient Medications   Medication Sig Dispense Refill    pantoprazole 40 MG Oral Tab EC Take 1 tablet (40 mg total) by mouth every morning before breakfast. 90 tablet 3    amLODIPine 5 MG Oral Tab Take 1 tablet (5 mg total) by mouth daily. 90 tablet 0    METOPROLOL SUCCINATE ER 50 MG Oral Tablet 24 Hr TAKE 1 TABLET DAILY 90 tablet 3    ALPRAZolam 0.5 MG Oral Tab Take 1 tablet (0.5 mg total) by mouth as needed for Anxiety (flying). 30 tablet 0       Allergies:  Allergies[1]    ROS:   CONSTITUTIONAL:  negative for fevers, rigors  EYES:  negative for diplopia   RESPIRATORY:  negative for severe shortness of breath  CARDIOVASCULAR:  negative for crushing sub-sternal chest pain  GASTROINTESTINAL:  see HPI  GENITOURINARY:  negative for dysuria or gross hematuria  INTEGUMENT/BREAST:  SKIN:  negative for jaundice   ALLERGIC/IMMUNOLOGIC:  negative for hay fever  ENDOCRINE:  negative for cold intolerance and heat intolerance  MUSCULOSKELETAL:  negative for joint effusion/severe  erythema  BEHAVIOR/PSYCH:  negative for psychotic behavior      PHYSICAL EXAM:   Blood pressure 123/86, pulse 69, height 5' 11\" (1.803 m), weight 184 lb (83.5 kg).    GEN - Patient appears comfortable and in no acute discomfort  ENT - MMM  EYES - the sclera appears anicteric  CV - no edema  RESP -  No increased work of breathing  ABDOMEN - soft, non-tender exam in all quadrants without rigidity or guarding, non-distended, no abnormal bowel sounds noted, no masses are palpated  SKIN - No jaundice  NEURO - Alert and appropriate, and gross movements of extremities normal  PSYCH - normal affect, non-agitated      Labs/Imaging:     Patient's pertinent labs and imaging were reviewed and discussed with patient today.      .  ASSESSMENT/PLAN:     Jesús Troy is a 24 year old year-old male with h/o HTN here for the following:    Epigastric pain  GERD with esophagitis - LA Class B/C on EGD 4/2025  Schatzki's ring - widely patent on EGD 4/2025  Family h/o celiac disease - duodenal biopsies EGD 4/2025 were negative.  H/o seasonal allergies - esophageal biopsies were negative for EoE.   Resolved with a PPI and recurs off a PPI.  Recommend continuing pantoprazole 40 mg daily given complications of GERD as above and avoiding GERD triggers. Recommend repeat EGD to ensure healing of his significant esophagitis noted on EGD 4/2025.     Liver mass  Mildly elevated liver enzymes, hepatic steatosis  A 7 cm right hepatic mass noted on US gallbladder 2/2024. 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  Recommend keeping follow up with hepatology. Agree with avoiding alcohol and lifestyle modifications to help reverse fatty liver disease, including weight reduction which he is already working on.    Recommend    - EGD with MAC to ensure healing of the esophagitis     - Continue pantoprazole 40 mg daily    - Continue lifestyle modifications to help control GERD and for weight reduction    - Keep follow up  with hepatology    EGD consent: I have discussed the risks, benefits, and alternatives to upper endoscopy/enteroscopy with the patient/primary decision maker [who demonstrated understanding], including but not limited to the risks of bleeding, infection, pain, death, as well as the risks of anesthesia and perforation all leading to prolonged hospitalization, surgical intervention, or even death. I also specifically mentioned the miss rate of upper endoscopy of 5-10% in the best of all circumstances.  The patient has agreed to sign an informed consent and elected to proceed with procedure with possible intervention [i.e. polypectomy, stent placement, etc.] as indicated.         Orders This Visit:  No orders of the defined types were placed in this encounter.      Meds This Visit:  Requested Prescriptions     Signed Prescriptions Disp Refills    pantoprazole 40 MG Oral Tab EC 90 tablet 3     Sig: Take 1 tablet (40 mg total) by mouth every morning before breakfast.       Imaging & Referrals:  None         Michelle Foster MD          This note was partially prepared using Dragon [employer] voice recognition dictation software. As a result, errors may occur. When identified, these errors have been corrected. While every attempt is made to correct errors during dictation, discrepancies may still exist.          [1]   Allergies  Allergen Reactions    Seasonal

## 2025-08-01 ENCOUNTER — TELEPHONE (OUTPATIENT)
Facility: CLINIC | Age: 25
End: 2025-08-01

## 2025-08-11 PROBLEM — K44.9 HIATAL HERNIA: Status: ACTIVE | Noted: 2025-08-11

## 2025-08-14 ENCOUNTER — OFFICE VISIT (OUTPATIENT)
Dept: FAMILY MEDICINE CLINIC | Facility: CLINIC | Age: 25
End: 2025-08-14

## 2025-08-14 VITALS
HEIGHT: 72 IN | DIASTOLIC BLOOD PRESSURE: 83 MMHG | HEART RATE: 70 BPM | BODY MASS INDEX: 25.33 KG/M2 | SYSTOLIC BLOOD PRESSURE: 127 MMHG | TEMPERATURE: 97 F | WEIGHT: 187 LBS | RESPIRATION RATE: 18 BRPM

## 2025-08-14 DIAGNOSIS — I10 HYPERTENSION, UNSPECIFIED TYPE: ICD-10-CM

## 2025-08-14 DIAGNOSIS — J30.2 SEASONAL ALLERGIES: Primary | ICD-10-CM

## 2025-08-14 DIAGNOSIS — F40.243 FEAR OF FLYING: ICD-10-CM

## 2025-08-14 PROCEDURE — 99214 OFFICE O/P EST MOD 30 MIN: CPT

## 2025-08-14 RX ORDER — ALPRAZOLAM 0.5 MG
0.5 TABLET ORAL AS NEEDED
Qty: 30 TABLET | Refills: 0 | Status: CANCELLED
Start: 2025-08-14

## 2025-08-14 RX ORDER — FLUTICASONE PROPIONATE 50 MCG
2 SPRAY, SUSPENSION (ML) NASAL DAILY
Qty: 1 EACH | Refills: 1 | Status: SHIPPED | OUTPATIENT
Start: 2025-08-14

## 2025-08-14 RX ORDER — AMLODIPINE BESYLATE 5 MG/1
5 TABLET ORAL DAILY
Qty: 90 TABLET | Refills: 1 | Status: SHIPPED | OUTPATIENT
Start: 2025-08-14

## 2025-08-14 RX ORDER — METOPROLOL SUCCINATE 50 MG/1
50 TABLET, EXTENDED RELEASE ORAL DAILY
Qty: 90 TABLET | Refills: 1 | Status: SHIPPED | OUTPATIENT
Start: 2025-08-14

## (undated) DEVICE — Device

## (undated) DEVICE — 60 ML SYRINGE REGULAR TIP: Brand: MONOJECT

## (undated) DEVICE — V2 SPECIMEN COLLECTION MANIFOLD KIT: Brand: NEPTUNE

## (undated) DEVICE — KIT CLEAN ENDOKIT 1.1OZ GOWNX2

## (undated) DEVICE — YANKAUER,BULB TIP,W/O VENT,RIGID,STERILE: Brand: MEDLINE

## (undated) DEVICE — KIT ENDO ORCAPOD 160/180/190

## (undated) DEVICE — GIJAW SINGLE-USE BIOPSY FORCEPS WITH NEEDLE: Brand: GIJAW

## (undated) DEVICE — CONMED SCOPE SAVER BITE BLOCK, 20X27 MM: Brand: SCOPE SAVER

## (undated) DEVICE — MEDI-VAC NON-CONDUCTIVE SUCTION TUBING 6MM X 1.8M (6FT.) L: Brand: CARDINAL HEALTH

## (undated) DEVICE — MEDI-VAC NON-CONDUCTIVE SUCTION TUBING: Brand: CARDINAL HEALTH

## (undated) NOTE — ED AVS SNAPSHOT
THE [facility] Emergency Department in [address] [facility]    Phone:  [phone]    Fax:  [address]   MRN: [id number]    Department:  THE [facility] Emergency Department in [place]   Date of Visi nuestro adminstrador de yajaira unger [phone]    Expect to receive an electronic request (by e-mail or text) to complete a self-assessment the day after your visit. You may also receive a call from our patient liason soon after your visit.  Also, some p [facility] ([address]) [address]  ([address]) [address] [facility] ([address]) [address] [place] [address] 30. [phone]

## (undated) NOTE — LETTER
[facility]  [address], [place], [place]    Authorization for Surgical Operation and Procedure                               I hereby authorize Michelle Fosetr MD, my physician and his/her assistants (if applicable), which may include medical students, residents, and/or fellows, to perform the following surgical operation/ procedure and administer such anesthesia as may be determined necessary by my physician: Operation/Procedure name (s) ESOPHAGOGASTRODUODENOSCOPY on Jesús Troy   2.   I recognize that during the surgical operation/procedure, unforeseen conditions may necessitate additional or different procedures than those listed above.  I, therefore, further authorize and request that the above-named surgeon, assistants, or designees perform such procedures as are, in their judgment, necessary and desirable.    3.   My surgeon/physician has discussed prior to my surgery the potential benefits, risks and side effects of this procedure; the likelihood of achieving goals; and potential problems that might occur during recuperation.  They also discussed reasonable alternatives to the procedure, including risks, benefits, and side effects related to the alternatives and risks related to not receiving this procedure.  I have had all my questions answered and I acknowledge that no guarantee has been made as to the result that may be obtained.    4.   Should the need arise during my operation/procedure, which includes change of level of care prior to discharge, I also consent to the administration of blood and/or blood products.  Further, I understand that despite careful testing and screening of blood or blood products by collecting agencies, I may still be subject to ill effects as a result of receiving a blood transfusion and/or blood products.  The following are some, but not all, of the potential risks that can occur: fever and allergic reactions, hemolytic reactions, transmission of  diseases such as Hepatitis, AIDS and Cytomegalovirus (CMV) and fluid overload.  In the event that I wish to have an autologous transfusion of my own blood, or a directed donor transfusion, I will discuss this with my physician.  Check only if Refusing Blood or Blood Products  I understand refusal of blood or blood products as deemed necessary by my physician may have serious consequences to my condition to include possible death. I hereby assume responsibility for my refusal and release the hospital, its personnel, and my physicians from any responsibility for the consequences of my refusal.    o  Refuse   5.   I authorize the use of any specimen, organs, tissues, body parts or foreign objects that may be removed from my body during the operation/procedure for diagnosis, research or teaching purposes and their subsequent disposal by hospital authorities.  I also authorize the release of specimen test results and/or written reports to my treating physician on the hospital medical staff or other referring or consulting physicians involved in my care, at the discretion of the Pathologist or my treating physician.    6.   I consent to the photographing or videotaping of the operations or procedures to be performed, including appropriate portions of my body for medical, scientific, or educational purposes, provided my identity is not revealed by the pictures or by descriptive texts accompanying them.  If the procedure has been photographed/videotaped, the surgeon will obtain the original picture, image, videotape or CD.  The hospital will not be responsible for storage, release or maintenance of the picture, image, tape or CD.    7.   I consent to the presence of a  or observers in the operating room as deemed necessary by my physician or their designees.    8.   I recognize that in the event my procedure results in extended X-Ray/fluoroscopy time, I may develop a skin reaction.    9. If I have a Do Not  Attempt Resuscitation (DNAR) order in place, that status will be suspended while in the operating room, procedural suite, and during the recovery period unless otherwise explicitly stated by me (or a person authorized to consent on my behalf). The surgeon or my attending physician will determine when the applicable recovery period ends for purposes of reinstating the DNAR order.  10. Patients having a sterilization procedure: I understand that if the procedure is successful the results will be permanent and it will therefore be impossible for me to inseminate, conceive, or bear children.  I also understand that the procedure is intended to result in sterility, although the result has not been guaranteed.   11. I acknowledge that my physician has explained sedation/analgesia administration to me including the risk and benefits I consent to the administration of sedation/analgesia as may be necessary or desirable in the judgment of my physician.    I CERTIFY THAT I HAVE READ AND FULLY UNDERSTAND THE ABOVE CONSENT TO OPERATION and/or OTHER PROCEDURE.     ____________________________________  _________________________________        ______________________________  Signature of Patient    Signature of Responsible Person                Printed Name of Responsible Person                                      ____________________________________  _____________________________                ________________________________  Signature of Witness        Date  Time         Relationship to Patient    STATEMENT OF PHYSICIAN My signature below affirms that prior to the time of the procedure; I have explained to the patient and/or his/her legal representative, the risks and benefits involved in the proposed treatment and any reasonable alternative to the proposed treatment. I have also explained the risks and benefits involved in refusal of the proposed treatment and alternatives to the proposed treatment and have answered the  patient's questions. If I have a significant financial interest in a co-management agreement or a significant financial interest in any product or implant, or other significant relationship used in this procedure/surgery, I have disclosed this and had a discussion with my patient.     _____________________________________________________              _____________________________  (Signature of Physician)                                                                                         (Date)                                   (Time)  Patient Name: Jesús Troy      : 2000      Printed: 2025     Medical Record #: [id number]                                      Page 1 of 1

## (undated) NOTE — LETTER
5/16/2025          Jesús Troy    [address] [place] [facility] [address]         Dear Jesús,    Our records indicate that the tests ordered for you by Michelle Foster MD  have not been done.  If you have, in fact, already completed the tests or you do not wish to have the tests done, please contact our office at THE NUMBER LISTED BELOW.  Otherwise, please proceed with the testing.  Enclosed is a duplicate order for your convenience.  Labs order   Orders Placed on 3/18/25  Celiac Disease Screen Reflex  Helicobacter Pylori Breath Test, Adult ----Please go to the lab to complete this test. Make sure you do not take a PPI (omeprazole/pantoprazole) or H2 blocker (famotidine) medication for 2 weeks prior to the test as this could result in a false negative result. Also do not eat one hour prior to the test.It is nothing to eat or drink (including chewing gum) 1 hour prior to testing. They will have you drink a special solution containing urea and have you blow into a bag      H-pylori breath test     This test requires the adult patient (>17 years of age) to fast for 1 hour prior to test   administration.   The patient should not have taken antibiotics, proton pump inhibitors   (e.g., Prilosec, Prevacid, Aciphex, Nexium),   or bismuth preparations (e.g., Pepto-Bismol) within the previous 14 days.   The effect of histamine 2-receptor antogonists (e.g., Axid, Pepcid, Tagamet, Zantac)   may reduce urease activity on urea breath tests and should be discontinued for 24-48 hours   before the sample is collected.   When used to monitor treatment, the test should be performed four weeks after   cessation of definitive therapy.   The patient should be informed that the Pranactin-Citric drink that will be administered   contains phenylalanine.   Phenylketonurics restrict dietary phenylalanine.             To schedule a test at any [facility], call Central Scheduling at [phone], Monday through Friday between 7:30am to 6pm and on Saturday between 8am and 1pm.   Evening and weekend appointments for your exam are available.     Please call [facility] at [phone] to schedule this test order.        Sincerely,    Michelle Foster MD  [address] [id number] [phone]

## (undated) NOTE — LETTER
[facility]  Administration of Anesthesia  I, Jesús Troy agree to be cared for by a physician anesthesiologist alone and/or with a nurse anesthetist, who is specially trained to monitor me and give me medicine to put me to sleep or keep me comfortable during my procedure    I understand that my anesthesiologist and/or anesthetist is not an employee or agent of [facility] or [employer]. He or she works for [facility]    As the patient asking for anesthesia services, I agree to:  Allow the anesthesiologist (anesthesia doctor) to give me medicine and do additional procedures as necessary. Some examples are: Starting or using an “IV” to give me medicine, fluids or blood during my procedure, and having a breathing tube placed to help me breathe when I’m asleep (intubation). In the event that my heart stops working properly, I understand that my anesthesiologist will make every effort to sustain my life, unless otherwise directed by [facility] Do Not Resuscitate documents.  Tell my anesthesia doctor before my procedure:  If I am pregnant.  The last time that I ate or drank.  iii. All of the medicines I take (including prescriptions, herbal supplements, and pills I can buy without a prescription (including street drugs/illegal medications). Failure to inform my anesthesiologist about these medicines may increase my risk of anesthetic complications.  iv.If I am allergic to anything or have had a reaction to anesthesia before.  I understand how the anesthesia medicine will help me (benefits).  I understand that with any type of anesthesia medicine there are risks:  The most common risks are: nausea, vomiting, sore throat, muscle soreness, damage to my eyes, mouth, or teeth (from breathing tube placement).  Rare risks include: remembering what happened during my procedure, allergic reactions to medications, injury to my airway, heart, lungs, vision, nerves, or  muscles and in extremely rare instances death.  My doctor has explained to me other choices available to me for my care (alternatives).  Pregnant Patients (“epidural”):  I understand that the risks of having an epidural (medicine given into my back to help control pain during labor), include itching, low blood pressure, difficulty urinating, headache or slowing of the baby’s heart. Very rare risks include infection, bleeding, seizure, irregular heart rhythms and nerve injury.  Regional Anesthesia (“spinal”, “epidural”, & “nerve blocks”):  I understand that rare but potential complications include headache, bleeding, infection, seizure, irregular heart rhythms, and nerve injury.    _____________________________________________________________________________  Patient (or Representative) Signature/Relationship to Patient  Date   Time    _____________________________________________________________________________   Name (if used)    Language/Organization   Time    _____________________________________________________________________________  Nurse Anesthetist Signature     Date   Time  _____________________________________________________________________________  Anesthesiologist Signature     Date   Time  I have discussed the procedure and information above with the patient (or patient’s representative) and answered their questions. The patient or their representative has agreed to have anesthesia services.    _____________________________________________________________________________  Witness        Date   Time  I have verified that the signature is that of the patient or patient’s representative, and that it was signed before the procedure  Patient Name: Jesús Troy     : 2000                 Printed: 2025 at 7:48 AM    Medical Record #: [id number]                                            Page 1 of 1  ----------ANESTHESIA CONSENT----------

## (undated) NOTE — ED AVS SNAPSHOT
[facility] [address] [facility]    Phone:  [phone]    Fax:  [address]   MRN: [id number]    Department:  [facility] in [place]   Date of Visi IF THERE IS ANY CHANGE OR WORSENING OF YOUR CONDITION, CALL YOUR PRIMARY CARE PHYSICIAN AT ONCE OR RETURN IMMEDIATELY TO THE EMERGENCY DEPARTMENT.     If you have been prescribed any medication(s), please fill your prescription right away and begin taking t